# Patient Record
Sex: FEMALE | Race: BLACK OR AFRICAN AMERICAN | NOT HISPANIC OR LATINO | Employment: OTHER | URBAN - METROPOLITAN AREA
[De-identification: names, ages, dates, MRNs, and addresses within clinical notes are randomized per-mention and may not be internally consistent; named-entity substitution may affect disease eponyms.]

---

## 2023-03-06 ENCOUNTER — NURSING HOME VISIT (OUTPATIENT)
Dept: POST ACUTE CARE | Facility: EXTERNAL LOCATION | Age: 83
End: 2023-03-06
Payer: MEDICARE

## 2023-03-06 DIAGNOSIS — I10 ESSENTIAL (PRIMARY) HYPERTENSION: ICD-10-CM

## 2023-03-06 DIAGNOSIS — G30.9 ALZHEIMER'S DISEASE, UNSPECIFIED (MULTI): ICD-10-CM

## 2023-03-06 DIAGNOSIS — F32.9 MAJOR DEPRESSIVE DISORDER, REMISSION STATUS UNSPECIFIED, UNSPECIFIED WHETHER RECURRENT: ICD-10-CM

## 2023-03-06 DIAGNOSIS — F02.80 ALZHEIMER'S DISEASE, UNSPECIFIED (MULTI): ICD-10-CM

## 2023-03-06 PROBLEM — Z93.1 S/P PERCUTANEOUS ENDOSCOPIC GASTROSTOMY (PEG) TUBE PLACEMENT (MULTI): Status: RESOLVED | Noted: 2023-03-06 | Resolved: 2023-03-06

## 2023-03-06 PROBLEM — E46 MALNUTRITION (MULTI): Status: ACTIVE | Noted: 2023-03-06

## 2023-03-06 PROBLEM — F03.90 DEMENTIA (MULTI): Status: ACTIVE | Noted: 2023-03-06

## 2023-03-06 PROBLEM — R00.1 BRADYCARDIA: Status: RESOLVED | Noted: 2023-03-06 | Resolved: 2023-03-06

## 2023-03-06 PROCEDURE — 99309 SBSQ NF CARE MODERATE MDM 30: CPT | Performed by: INTERNAL MEDICINE

## 2023-03-06 NOTE — LETTER
Subjective  Chief complaint: Ella Simms is a 83 y.o. female who is a long term resident. Presents forGeneral medical care and follow-up  HPI:  Patient presents for general medical care and f/u.  Patient seen and examined at bedside.  Patient denies any pain or discomfort.  Patient continues on oxygen via nasal cannula.  No signs or symptoms of any respiratory distress.  Patient denies any shortness of breath or wheezing.  Staff reports no new issues.  No acute distress.        Review of Systems  All systems reviewed and negative except for what was mentioned in the HPI    Vital signs: 117/69, 97.6, 68, 18    Objective  Physical Exam  Constitutional:       General: She is not in acute distress.  Eyes:      Extraocular Movements: Extraocular movements intact.   Cardiovascular:      Rate and Rhythm: Regular rhythm.   Pulmonary:      Effort: Pulmonary effort is normal.      Breath sounds: Normal breath sounds.   Abdominal:      General: Bowel sounds are normal.      Palpations: Abdomen is soft.   Musculoskeletal:      Cervical back: Neck supple.      Right lower leg: No edema.      Left lower leg: No edema.   Neurological:      Mental Status: She is alert.   Psychiatric:         Mood and Affect: Mood normal.         Behavior: Behavior is cooperative.         Assessment/Plan  Problem List Items Addressed This Visit       Alzheimer's disease, unspecified (CMS/HCC)     Continue Aricept.         Essential (primary) hypertension     Blood pressures controlled.  Continue metoprolol.         Major depressive disorder      Mood is stable.  Denies feeling down and thoughts of harming self or others.Continue Remeron, paroxetine, Benzatropine          Medications, treatments, and labs reviewed  Continue medications and treatments as listed in [facility]  Scribe Attestation  By signing my name below, erin HOLLIS Scribe   attest that this documentation has been prepared under the direction and in the presence of Yanet Brand  MD.    Provider Attestation - Scribe documentation  All medical record entries made by the Scribe were at my direction and personally dictated by me. I have reviewed the chart and agree that the record accurately reflects my personal performance of the history, physical exam, discussion and plan.

## 2023-03-06 NOTE — ASSESSMENT & PLAN NOTE
Mood is stable.  Denies feeling down and thoughts of harming self or others.Continue Remeron, paroxetine, Benzatropine

## 2023-03-06 NOTE — PROGRESS NOTES
Subjective   Chief complaint: Ella Simms is a 83 y.o. female who is a long term resident. Presents forGeneral medical care and follow-up  HPI:  Patient presents for general medical care and f/u.  Patient seen and examined at bedside.  Patient denies any pain or discomfort.  Patient continues on oxygen via nasal cannula.  No signs or symptoms of any respiratory distress.  Patient denies any shortness of breath or wheezing.  Staff reports no new issues.  No acute distress.        Review of Systems  All systems reviewed and negative except for what was mentioned in the HPI    Vital signs: 117/69, 97.6, 68, 18    Objective   Physical Exam  Constitutional:       General: She is not in acute distress.  Eyes:      Extraocular Movements: Extraocular movements intact.   Cardiovascular:      Rate and Rhythm: Regular rhythm.   Pulmonary:      Effort: Pulmonary effort is normal.      Breath sounds: Normal breath sounds.   Abdominal:      General: Bowel sounds are normal.      Palpations: Abdomen is soft.   Musculoskeletal:      Cervical back: Neck supple.      Right lower leg: No edema.      Left lower leg: No edema.   Neurological:      Mental Status: She is alert.   Psychiatric:         Mood and Affect: Mood normal.         Behavior: Behavior is cooperative.         Assessment/Plan   Problem List Items Addressed This Visit       Alzheimer's disease, unspecified (CMS/HCC)     Continue Aricept.         Essential (primary) hypertension     Blood pressures controlled.  Continue metoprolol.         Major depressive disorder      Mood is stable.  Denies feeling down and thoughts of harming self or others.Continue Remeron, paroxetine, Benzatropine          Medications, treatments, and labs reviewed  Continue medications and treatments as listed in [facility]  Scribe Attestation  By signing my name below, erin HOLLIS Scribe   attest that this documentation has been prepared under the direction and in the presence of Yanet CORLEY  MD Sunday.    Provider Attestation - Scribe documentation  All medical record entries made by the Scribe were at my direction and personally dictated by me. I have reviewed the chart and agree that the record accurately reflects my personal performance of the history, physical exam, discussion and plan.

## 2023-03-17 NOTE — PROGRESS NOTES
Subjective   Chief complaint: Ella Simms is a 83 y.o. female who is a long term resident. Presents forGeneral medical care and follow-up  HPI:  Patient presents for general medical care and f/u.  Patient seen and examined at bedside.  Patient denies any pain or discomfort.  Patient continues on oxygen via nasal cannula.  No signs or symptoms of any respiratory distress.  Patient denies any shortness of breath or wheezing.  Staff reports no new issues.  No acute distress.        Review of Systems  All systems reviewed and negative except for what was mentioned in the HPI    Vital signs: 117/69, 97.6, 68, 18    Objective   Physical Exam  Constitutional:       General: She is not in acute distress.  Eyes:      Extraocular Movements: Extraocular movements intact.   Cardiovascular:      Rate and Rhythm: Regular rhythm.   Pulmonary:      Effort: Pulmonary effort is normal.      Breath sounds: Normal breath sounds.   Abdominal:      General: Bowel sounds are normal.      Palpations: Abdomen is soft.   Musculoskeletal:      Cervical back: Neck supple.      Right lower leg: No edema.      Left lower leg: No edema.   Neurological:      Mental Status: She is alert.   Psychiatric:         Mood and Affect: Mood normal.         Behavior: Behavior is cooperative.         Assessment/Plan   Problem List Items Addressed This Visit          Nervous    Alzheimer's disease, unspecified (CMS/HCC)     Continue Aricept.            Circulatory    Essential (primary) hypertension     Blood pressures controlled.  Continue metoprolol.            Other    Major depressive disorder      Mood is stable.  Denies feeling down and thoughts of harming self or others.Continue Remeron, paroxetine, Benzatropine          Medications, treatments, and labs reviewed  Continue medications and treatments as listed in [facility]  Scribe Attestation  By signing my name below, erin HOLLIS , Cathy   attest that this documentation has been prepared under the  direction and in the presence of Yanet Brand MD.    Provider Attestation - Scribe documentation  All medical record entries made by the Scribe were at my direction and personally dictated by me. I have reviewed the chart and agree that the record accurately reflects my personal performance of the history, physical exam, discussion and plan.

## 2023-05-01 ENCOUNTER — NURSING HOME VISIT (OUTPATIENT)
Dept: POST ACUTE CARE | Facility: EXTERNAL LOCATION | Age: 83
End: 2023-05-01
Payer: MEDICARE

## 2023-05-01 DIAGNOSIS — F32.9 MAJOR DEPRESSIVE DISORDER, REMISSION STATUS UNSPECIFIED, UNSPECIFIED WHETHER RECURRENT: ICD-10-CM

## 2023-05-01 DIAGNOSIS — F02.80 ALZHEIMER'S DISEASE, UNSPECIFIED (MULTI): ICD-10-CM

## 2023-05-01 DIAGNOSIS — G30.9 ALZHEIMER'S DISEASE, UNSPECIFIED (MULTI): ICD-10-CM

## 2023-05-01 DIAGNOSIS — I10 ESSENTIAL (PRIMARY) HYPERTENSION: ICD-10-CM

## 2023-05-01 PROCEDURE — 99309 SBSQ NF CARE MODERATE MDM 30: CPT | Performed by: INTERNAL MEDICINE

## 2023-05-01 NOTE — LETTER
Patient: Ella Simms  : 1940    Encounter Date: 2023    PROGRESS NOTE    Subjective  Chief complaint: Ella Simms is a 83 y.o. female who is a long term care patient being seen and evaluated for monthly general medical care and follow-up.    HPI:    patient presents for general medical care and f/u.  Patient seen and examined at bedside.  No issues per nursing.  Patient has no acute complaints.  Patient has ALZ/dementia and requires assist with ADLs.    Patient with diagnosis of depression.    Denies feeling down and thoughts of harming self or others.  HTN,  Denies chest pain and headache.            Objective  Vital signs:   125/73, 98%    Physical Exam  Constitutional:       General: She is not in acute distress.  Eyes:      Extraocular Movements: Extraocular movements intact.   Cardiovascular:      Rate and Rhythm: Normal rate and regular rhythm.   Pulmonary:      Effort: Pulmonary effort is normal.      Breath sounds: Normal breath sounds.   Abdominal:      General: Bowel sounds are normal.      Palpations: Abdomen is soft.   Musculoskeletal:      Cervical back: Neck supple.      Right lower leg: No edema.      Left lower leg: No edema.   Neurological:      Mental Status: She is alert.   Psychiatric:         Mood and Affect: Mood normal.         Behavior: Behavior is cooperative.         Assessment/Plan  Problem List Items Addressed This Visit          Nervous    Alzheimer's disease, unspecified (CMS/HCC)     Continue Aricept.  Mentation at baseline            Circulatory    Essential (primary) hypertension     Blood pressures controlled.  Continue metoprolol.            Other    Major depressive disorder      Mood is stable.  Denies feeling down and thoughts of harming self or others.Continue Remeron, paroxetine, Benzatropine          Medications, treatments, and labs reviewed  Continue medications and treatments as listed in [facility]    Scribe Attestation  By signing my name below, Ani HOLLIS  Cathy Mcclain   attest that this documentation has been prepared under the direction and in the presence of Yanet Brand MD.    Provider Attestation - Scribe documentation  All medical record entries made by the Scribe were at my direction and personally dictated by me. I have reviewed the chart and agree that the record accurately reflects my personal performance of the history, physical exam, discussion and plan.    1. Alzheimer's disease, unspecified (CMS/HCC)        2. Essential (primary) hypertension        3. Major depressive disorder, remission status unspecified, unspecified whether recurrent               Electronically Signed By: Yanet Brand MD   5/2/23  5:31 PM

## 2023-05-02 NOTE — PROGRESS NOTES
PROGRESS NOTE    Subjective   Chief complaint: Ella Simms is a 83 y.o. female who is a long term care patient being seen and evaluated for monthly general medical care and follow-up.    HPI:    patient presents for general medical care and f/u.  Patient seen and examined at bedside.  No issues per nursing.  Patient has no acute complaints.  Patient has ALZ/dementia and requires assist with ADLs.    Patient with diagnosis of depression.    Denies feeling down and thoughts of harming self or others.  HTN,  Denies chest pain and headache.            Objective   Vital signs:   125/73, 98%    Physical Exam  Constitutional:       General: She is not in acute distress.  Eyes:      Extraocular Movements: Extraocular movements intact.   Cardiovascular:      Rate and Rhythm: Normal rate and regular rhythm.   Pulmonary:      Effort: Pulmonary effort is normal.      Breath sounds: Normal breath sounds.   Abdominal:      General: Bowel sounds are normal.      Palpations: Abdomen is soft.   Musculoskeletal:      Cervical back: Neck supple.      Right lower leg: No edema.      Left lower leg: No edema.   Neurological:      Mental Status: She is alert.   Psychiatric:         Mood and Affect: Mood normal.         Behavior: Behavior is cooperative.         Assessment/Plan   Problem List Items Addressed This Visit          Nervous    Alzheimer's disease, unspecified (CMS/HCC)     Continue Aricept.  Mentation at baseline            Circulatory    Essential (primary) hypertension     Blood pressures controlled.  Continue metoprolol.            Other    Major depressive disorder      Mood is stable.  Denies feeling down and thoughts of harming self or others.Continue Remeron, paroxetine, Benzatropine          Medications, treatments, and labs reviewed  Continue medications and treatments as listed in [facility]    Scribe Attestation  By signing my name below, IAni, Scribe   attest that this documentation has been prepared  under the direction and in the presence of Yanet Brand MD.    Provider Attestation - Scribe documentation  All medical record entries made by the Scribe were at my direction and personally dictated by me. I have reviewed the chart and agree that the record accurately reflects my personal performance of the history, physical exam, discussion and plan.    1. Alzheimer's disease, unspecified (CMS/HCC)        2. Essential (primary) hypertension        3. Major depressive disorder, remission status unspecified, unspecified whether recurrent

## 2023-06-05 ENCOUNTER — NURSING HOME VISIT (OUTPATIENT)
Dept: POST ACUTE CARE | Facility: EXTERNAL LOCATION | Age: 83
End: 2023-06-05
Payer: MEDICARE

## 2023-06-05 DIAGNOSIS — F02.80 ALZHEIMER'S DISEASE, UNSPECIFIED (MULTI): ICD-10-CM

## 2023-06-05 DIAGNOSIS — F32.9 MAJOR DEPRESSIVE DISORDER, REMISSION STATUS UNSPECIFIED, UNSPECIFIED WHETHER RECURRENT: ICD-10-CM

## 2023-06-05 DIAGNOSIS — G30.9 ALZHEIMER'S DISEASE, UNSPECIFIED (MULTI): ICD-10-CM

## 2023-06-05 DIAGNOSIS — I10 ESSENTIAL (PRIMARY) HYPERTENSION: ICD-10-CM

## 2023-06-05 PROCEDURE — 99309 SBSQ NF CARE MODERATE MDM 30: CPT | Performed by: INTERNAL MEDICINE

## 2023-06-05 NOTE — LETTER
Patient: Ella Simms  : 1940    Encounter Date: 2023    PROGRESS NOTE    Subjective  Chief complaint: Ella Simms is a 83 y.o. female who is a long term care patient being seen and evaluated for monthly general medical care and follow-up.    HPI:    patient presents for general medical care and f/u.  Patient seen and examined at bedside.  No issues per nursing.  Patient has no acute complaints.  Patient has ALZ/dementia and requires assist with ADLs.    Patient with diagnosis of depression.    Denies feeling down and thoughts of harming self or others.  HTN,  Denies chest pain and headache.            Objective  Vital signs:   124/74, 98%    Physical Exam  Constitutional:       General: She is not in acute distress.  Eyes:      Extraocular Movements: Extraocular movements intact.   Cardiovascular:      Rate and Rhythm: Normal rate and regular rhythm.   Pulmonary:      Effort: Pulmonary effort is normal.      Breath sounds: Normal breath sounds.   Abdominal:      General: Bowel sounds are normal.      Palpations: Abdomen is soft.   Musculoskeletal:      Cervical back: Neck supple.      Right lower leg: No edema.      Left lower leg: No edema.   Neurological:      Mental Status: She is alert.   Psychiatric:         Mood and Affect: Mood normal.         Behavior: Behavior is cooperative.         Assessment/Plan  Problem List Items Addressed This Visit          Nervous    Alzheimer's disease, unspecified (CMS/HCC)     Continue Aricept.  Mentation at baseline            Circulatory    Essential (primary) hypertension     Blood pressures controlled.    Continue metoprolol.  Monitor BP            Other    Major depressive disorder      Mood is stable.    .Continue Remeron, paroxetine, Benzatropine        Medications, treatments, and labs reviewed  Continue medications and treatments as listed in [facility]    Scribe Attestation  By signing my name below, I, Ani Mcclain, Scribe   attest that this  documentation has been prepared under the direction and in the presence of Yanet Brand MD.    Provider Attestation - Scribe documentation  All medical record entries made by the Scribe were at my direction and personally dictated by me. I have reviewed the chart and agree that the record accurately reflects my personal performance of the history, physical exam, discussion and plan.    1. Major depressive disorder, remission status unspecified, unspecified whether recurrent        2. Essential (primary) hypertension        3. Alzheimer's disease, unspecified (CMS/[facility])               Electronically Signed By: Yanet Brand MD   6/6/23  8:05 PM

## 2023-06-06 NOTE — PROGRESS NOTES
PROGRESS NOTE    Subjective   Chief complaint: Ella Simms is a 83 y.o. female who is a long term care patient being seen and evaluated for monthly general medical care and follow-up.    HPI:    patient presents for general medical care and f/u.  Patient seen and examined at bedside.  No issues per nursing.  Patient has no acute complaints.  Patient has ALZ/dementia and requires assist with ADLs.    Patient with diagnosis of depression.    Denies feeling down and thoughts of harming self or others.  HTN,  Denies chest pain and headache.            Objective   Vital signs:   124/74, 98%    Physical Exam  Constitutional:       General: She is not in acute distress.  Eyes:      Extraocular Movements: Extraocular movements intact.   Cardiovascular:      Rate and Rhythm: Normal rate and regular rhythm.   Pulmonary:      Effort: Pulmonary effort is normal.      Breath sounds: Normal breath sounds.   Abdominal:      General: Bowel sounds are normal.      Palpations: Abdomen is soft.   Musculoskeletal:      Cervical back: Neck supple.      Right lower leg: No edema.      Left lower leg: No edema.   Neurological:      Mental Status: She is alert.   Psychiatric:         Mood and Affect: Mood normal.         Behavior: Behavior is cooperative.         Assessment/Plan   Problem List Items Addressed This Visit          Nervous    Alzheimer's disease, unspecified (CMS/HCC)     Continue Aricept.  Mentation at baseline            Circulatory    Essential (primary) hypertension     Blood pressures controlled.    Continue metoprolol.  Monitor BP            Other    Major depressive disorder      Mood is stable.    .Continue Remeron, paroxetine, Benzatropine        Medications, treatments, and labs reviewed  Continue medications and treatments as listed in [facility]    Scribe Attestation  By signing my name below, IAni, Cathy   attest that this documentation has been prepared under the direction and in the presence of  Yanet Brand MD.    Provider Attestation - Scribe documentation  All medical record entries made by the Scribe were at my direction and personally dictated by me. I have reviewed the chart and agree that the record accurately reflects my personal performance of the history, physical exam, discussion and plan.    1. Major depressive disorder, remission status unspecified, unspecified whether recurrent        2. Essential (primary) hypertension        3. Alzheimer's disease, unspecified (CMS/[facility])

## 2023-07-03 ENCOUNTER — NURSING HOME VISIT (OUTPATIENT)
Dept: POST ACUTE CARE | Facility: EXTERNAL LOCATION | Age: 83
End: 2023-07-03
Payer: MEDICARE

## 2023-07-03 DIAGNOSIS — G30.9 ALZHEIMER'S DISEASE, UNSPECIFIED (MULTI): ICD-10-CM

## 2023-07-03 DIAGNOSIS — F02.80 ALZHEIMER'S DISEASE, UNSPECIFIED (MULTI): ICD-10-CM

## 2023-07-03 DIAGNOSIS — I10 ESSENTIAL (PRIMARY) HYPERTENSION: ICD-10-CM

## 2023-07-03 DIAGNOSIS — F32.9 MAJOR DEPRESSIVE DISORDER, REMISSION STATUS UNSPECIFIED, UNSPECIFIED WHETHER RECURRENT: ICD-10-CM

## 2023-07-03 PROCEDURE — 99309 SBSQ NF CARE MODERATE MDM 30: CPT | Performed by: INTERNAL MEDICINE

## 2023-07-03 NOTE — PROGRESS NOTES
PROGRESS NOTE    Subjective   Chief complaint: Ella Simms is a 83 y.o. female who is a long term care patient being seen and evaluated for monthly general medical care and follow-up    HPI:  Patient presents for general medical care and f/u.  Patient seen and examined at bedside.  No issues per nursing.  Patient has no acute complaints.  Patient has ALZ/dementia and requires assist with ADLs.    Patient with diagnosis of depression, denies feeling down and thoughts of harming self or others.  HTN, denies chest pain and headache.  No new concerns today.  Denies n/v/f/c pain.            Objective   Vital signs: 130/80,69,94%,98.0,18    Physical Exam  Constitutional:       General: She is not in acute distress.  Eyes:      Extraocular Movements: Extraocular movements intact.   Cardiovascular:      Rate and Rhythm: Normal rate and regular rhythm.   Pulmonary:      Effort: Pulmonary effort is normal.      Breath sounds: Normal breath sounds.   Abdominal:      General: Bowel sounds are normal.      Palpations: Abdomen is soft.   Musculoskeletal:      Cervical back: Neck supple.      Right lower leg: No edema.      Left lower leg: No edema.   Neurological:      Mental Status: She is alert.   Psychiatric:         Mood and Affect: Mood normal.         Behavior: Behavior is cooperative.         Assessment/Plan   Problem List Items Addressed This Visit       Essential (primary) hypertension     Blood pressures controlled.    Continue metoprolol.  Monitor BP         Alzheimer's disease, unspecified (CMS/HCC)     Continue Aricept.  Mentation at baseline         Major depressive disorder      Mood is stable.    Continue Remeron, paroxetine, Benzatropine          Medications, treatments, and labs reviewed  Continue medications and treatments as listed in EMR      Scribe Attestation  I, Cathy Garcia   attest that this documentation has been prepared under the direction and in the presence of Yanet Brand MD.      Provider Attestation - Scribe documentation  All medical record entries made by the Scribe were at my direction and personally dictated by me. I have reviewed the chart and agree that the record accurately reflects my personal performance of the history, physical exam, discussion and plan.   Yanet Brand MD

## 2023-07-03 NOTE — LETTER
Patient: Ella Simms  : 1940    Encounter Date: 2023    PROGRESS NOTE    Subjective  Chief complaint: Ella Simms is a 83 y.o. female who is a long term care patient being seen and evaluated for monthly general medical care and follow-up    HPI:  Patient presents for general medical care and f/u.  Patient seen and examined at bedside.  No issues per nursing.  Patient has no acute complaints.  Patient has ALZ/dementia and requires assist with ADLs.    Patient with diagnosis of depression, denies feeling down and thoughts of harming self or others.  HTN, denies chest pain and headache.  No new concerns today.  Denies n/v/f/c pain.            Objective  Vital signs: 130/80,69,94%,98.0,18    Physical Exam  Constitutional:       General: She is not in acute distress.  Eyes:      Extraocular Movements: Extraocular movements intact.   Cardiovascular:      Rate and Rhythm: Normal rate and regular rhythm.   Pulmonary:      Effort: Pulmonary effort is normal.      Breath sounds: Normal breath sounds.   Abdominal:      General: Bowel sounds are normal.      Palpations: Abdomen is soft.   Musculoskeletal:      Cervical back: Neck supple.      Right lower leg: No edema.      Left lower leg: No edema.   Neurological:      Mental Status: She is alert.   Psychiatric:         Mood and Affect: Mood normal.         Behavior: Behavior is cooperative.         Assessment/Plan  Problem List Items Addressed This Visit       Essential (primary) hypertension     Blood pressures controlled.    Continue metoprolol.  Monitor BP         Alzheimer's disease, unspecified (CMS/HCC)     Continue Aricept.  Mentation at baseline         Major depressive disorder      Mood is stable.    Continue Remeron, paroxetine, Benzatropine          Medications, treatments, and labs reviewed  Continue medications and treatments as listed in EMR      Scribe Attestation  I, Valerie Saini, Cathy   attest that this documentation has been  prepared under the direction and in the presence of Yanet Brand MD.     Provider Attestation - Scribe documentation  All medical record entries made by the Scribe were at my direction and personally dictated by me. I have reviewed the chart and agree that the record accurately reflects my personal performance of the history, physical exam, discussion and plan.   Yanet Brand MD      Electronically Signed By: Yanet Brand MD   7/3/23  4:18 PM

## 2023-08-14 ENCOUNTER — NURSING HOME VISIT (OUTPATIENT)
Dept: POST ACUTE CARE | Facility: EXTERNAL LOCATION | Age: 83
End: 2023-08-14
Payer: MEDICARE

## 2023-08-14 DIAGNOSIS — F02.80 ALZHEIMER'S DISEASE, UNSPECIFIED (MULTI): ICD-10-CM

## 2023-08-14 DIAGNOSIS — F32.9 MAJOR DEPRESSIVE DISORDER, REMISSION STATUS UNSPECIFIED, UNSPECIFIED WHETHER RECURRENT: Primary | ICD-10-CM

## 2023-08-14 DIAGNOSIS — I10 ESSENTIAL (PRIMARY) HYPERTENSION: ICD-10-CM

## 2023-08-14 DIAGNOSIS — G30.9 ALZHEIMER'S DISEASE, UNSPECIFIED (MULTI): ICD-10-CM

## 2023-08-14 PROCEDURE — 99309 SBSQ NF CARE MODERATE MDM 30: CPT | Performed by: INTERNAL MEDICINE

## 2023-08-14 NOTE — LETTER
Patient: Ella Simms  : 1940    Encounter Date: 2023    PROGRESS NOTE    Subjective  Chief complaint: Ella Simms is a 83 y.o. female who is a long term care patient being seen and evaluated for monthly general medical care and follow-up    HPI:  Patient presents for general medical care and f/u.  Patient seen and examined at bedside.  No issues per nursing.  Patient has no acute complaints.  Patient has ALZ/dementia and requires assist with ADLs.    Patient with diagnosis of depression, denies feeling down and thoughts of harming self or others.  HTN, denies chest pain and headache.  No new concerns today.  Denies n/v/f/c pain.            Objective  Vital signs: 128/79, 97%    Physical Exam  Constitutional:       General: She is not in acute distress.  Eyes:      Extraocular Movements: Extraocular movements intact.   Cardiovascular:      Rate and Rhythm: Normal rate and regular rhythm.   Pulmonary:      Effort: Pulmonary effort is normal.      Breath sounds: Normal breath sounds.   Abdominal:      General: Bowel sounds are normal.      Palpations: Abdomen is soft.   Musculoskeletal:      Cervical back: Neck supple.      Right lower leg: No edema.      Left lower leg: No edema.   Neurological:      Mental Status: She is alert.   Psychiatric:         Mood and Affect: Mood normal.         Behavior: Behavior is cooperative.         Assessment/Plan  Problem List Items Addressed This Visit       Essential (primary) hypertension     Blood pressures controlled.    Continue metoprolol.  Monitor BP         Alzheimer's disease, unspecified (CMS/HCC)     Continue Aricept.  Mentation at baseline         Major depressive disorder - Primary      Mood is stable.    Continue Remeron, paroxetine, Benzatropine        Medications, treatments, and labs reviewed  Continue medications and treatments as listed in EMR      Scribe Attestation  I, Ani Mcclain, Scribe   attest that this documentation has been prepared  under the direction and in the presence of Yanet Brand MD.     Provider Attestation - Scribe documentation  All medical record entries made by the Scribe were at my direction and personally dictated by me. I have reviewed the chart and agree that the record accurately reflects my personal performance of the history, physical exam, discussion and plan.   Yanet Brand MD  1. Major depressive disorder, remission status unspecified, unspecified whether recurrent        2. Essential (primary) hypertension        3. Alzheimer's disease, unspecified (CMS/[facility])              Electronically Signed By: Yanet Brand MD   8/14/23  3:50 PM

## 2023-08-14 NOTE — PROGRESS NOTES
PROGRESS NOTE    Subjective   Chief complaint: Ella Simms is a 83 y.o. female who is a long term care patient being seen and evaluated for monthly general medical care and follow-up    HPI:  Patient presents for general medical care and f/u.  Patient seen and examined at bedside.  No issues per nursing.  Patient has no acute complaints.  Patient has ALZ/dementia and requires assist with ADLs.    Patient with diagnosis of depression, denies feeling down and thoughts of harming self or others.  HTN, denies chest pain and headache.  No new concerns today.  Denies n/v/f/c pain.            Objective   Vital signs: 128/79, 97%    Physical Exam  Constitutional:       General: She is not in acute distress.  Eyes:      Extraocular Movements: Extraocular movements intact.   Cardiovascular:      Rate and Rhythm: Normal rate and regular rhythm.   Pulmonary:      Effort: Pulmonary effort is normal.      Breath sounds: Normal breath sounds.   Abdominal:      General: Bowel sounds are normal.      Palpations: Abdomen is soft.   Musculoskeletal:      Cervical back: Neck supple.      Right lower leg: No edema.      Left lower leg: No edema.   Neurological:      Mental Status: She is alert.   Psychiatric:         Mood and Affect: Mood normal.         Behavior: Behavior is cooperative.         Assessment/Plan   Problem List Items Addressed This Visit       Essential (primary) hypertension     Blood pressures controlled.    Continue metoprolol.  Monitor BP         Alzheimer's disease, unspecified (CMS/HCC)     Continue Aricept.  Mentation at baseline         Major depressive disorder - Primary      Mood is stable.    Continue Remeron, paroxetine, Benzatropine        Medications, treatments, and labs reviewed  Continue medications and treatments as listed in EMR      Scribe Attestation  I, Ani Mcclain Scribe   attest that this documentation has been prepared under the direction and in the presence of Yanet Brand MD.      Provider Attestation - Scribe documentation  All medical record entries made by the Scribe were at my direction and personally dictated by me. I have reviewed the chart and agree that the record accurately reflects my personal performance of the history, physical exam, discussion and plan.   Yanet Brand MD  1. Major depressive disorder, remission status unspecified, unspecified whether recurrent        2. Essential (primary) hypertension        3. Alzheimer's disease, unspecified (CMS/[facility])

## 2023-09-11 ENCOUNTER — NURSING HOME VISIT (OUTPATIENT)
Dept: POST ACUTE CARE | Facility: EXTERNAL LOCATION | Age: 83
End: 2023-09-11
Payer: MEDICARE

## 2023-09-11 DIAGNOSIS — F02.80 ALZHEIMER'S DISEASE, UNSPECIFIED (MULTI): ICD-10-CM

## 2023-09-11 DIAGNOSIS — F32.9 MAJOR DEPRESSIVE DISORDER, REMISSION STATUS UNSPECIFIED, UNSPECIFIED WHETHER RECURRENT: Primary | ICD-10-CM

## 2023-09-11 DIAGNOSIS — G30.9 ALZHEIMER'S DISEASE, UNSPECIFIED (MULTI): ICD-10-CM

## 2023-09-11 DIAGNOSIS — I10 ESSENTIAL (PRIMARY) HYPERTENSION: ICD-10-CM

## 2023-09-11 PROCEDURE — 99309 SBSQ NF CARE MODERATE MDM 30: CPT | Performed by: INTERNAL MEDICINE

## 2023-09-11 NOTE — LETTER
Patient: Ella Simms  : 1940    Encounter Date: 2023    PROGRESS NOTE    Subjective  Chief complaint: Ella Simms is a 83 y.o. female who is a long term care patient being seen and evaluated for monthly general medical care and follow-up    HPI:  Patient presents for general medical care and f/u.  Patient seen and examined at bedside.  No issues per nursing.  Patient has no acute complaints.  Patient has ALZ/dementia and requires assist with ADLs.    Patient with diagnosis of depression, denies feeling down and thoughts of harming self or others.  HTN, denies chest pain and headache.  No new concerns today.  Denies n/v/f/c pain.            Objective  Vital signs: 129/76, 95%    Physical Exam  Constitutional:       General: She is not in acute distress.  Eyes:      Extraocular Movements: Extraocular movements intact.   Cardiovascular:      Rate and Rhythm: Normal rate and regular rhythm.   Pulmonary:      Effort: Pulmonary effort is normal.      Breath sounds: Normal breath sounds.   Abdominal:      General: Bowel sounds are normal.      Palpations: Abdomen is soft.   Musculoskeletal:      Cervical back: Neck supple.      Right lower leg: No edema.      Left lower leg: No edema.   Neurological:      Mental Status: She is alert.   Psychiatric:         Mood and Affect: Mood normal.         Behavior: Behavior is cooperative.         Assessment/Plan  Problem List Items Addressed This Visit       Essential (primary) hypertension     Blood pressures controlled.    Continue metoprolol.  Monitor BP         Alzheimer's disease, unspecified (CMS/HCC)     Continue Aricept.  Mentation at baseline         Major depressive disorder - Primary      Mood is stable.    Continue Remeron, paroxetine, Benzatropine        Medications, treatments, and labs reviewed  Continue medications and treatments as listed in EMR      Scribe Attestation  I, Ani sky, Scribe   attest that this documentation has been prepared  under the direction and in the presence of Yanet Brand MD.     Provider Attestation - Scribe documentation  All medical record entries made by the Scribe were at my direction and personally dictated by me. I have reviewed the chart and agree that the record accurately reflects my personal performance of the history, physical exam, discussion and plan.   Yanet Brand MD  1. Major depressive disorder, remission status unspecified, unspecified whether recurrent        2. Alzheimer's disease, unspecified (CMS/[facility])        3. Essential (primary) hypertension              Electronically Signed By: Yanet Brand MD   9/13/23  1:37 PM

## 2023-09-13 NOTE — PROGRESS NOTES
PROGRESS NOTE    Subjective   Chief complaint: Ella Simms is a 83 y.o. female who is a long term care patient being seen and evaluated for monthly general medical care and follow-up    HPI:  Patient presents for general medical care and f/u.  Patient seen and examined at bedside.  No issues per nursing.  Patient has no acute complaints.  Patient has ALZ/dementia and requires assist with ADLs.    Patient with diagnosis of depression, denies feeling down and thoughts of harming self or others.  HTN, denies chest pain and headache.  No new concerns today.  Denies n/v/f/c pain.            Objective   Vital signs: 129/76, 95%    Physical Exam  Constitutional:       General: She is not in acute distress.  Eyes:      Extraocular Movements: Extraocular movements intact.   Cardiovascular:      Rate and Rhythm: Normal rate and regular rhythm.   Pulmonary:      Effort: Pulmonary effort is normal.      Breath sounds: Normal breath sounds.   Abdominal:      General: Bowel sounds are normal.      Palpations: Abdomen is soft.   Musculoskeletal:      Cervical back: Neck supple.      Right lower leg: No edema.      Left lower leg: No edema.   Neurological:      Mental Status: She is alert.   Psychiatric:         Mood and Affect: Mood normal.         Behavior: Behavior is cooperative.         Assessment/Plan   Problem List Items Addressed This Visit       Essential (primary) hypertension     Blood pressures controlled.    Continue metoprolol.  Monitor BP         Alzheimer's disease, unspecified (CMS/HCC)     Continue Aricept.  Mentation at baseline         Major depressive disorder - Primary      Mood is stable.    Continue Remeron, paroxetine, Benzatropine        Medications, treatments, and labs reviewed  Continue medications and treatments as listed in EMR      Scribe Attestation  I, Ani sky Scribe   attest that this documentation has been prepared under the direction and in the presence of Yanet Brand MD.      Provider Attestation - Scribe documentation  All medical record entries made by the Scribe were at my direction and personally dictated by me. I have reviewed the chart and agree that the record accurately reflects my personal performance of the history, physical exam, discussion and plan.   Yanet Brand MD  1. Major depressive disorder, remission status unspecified, unspecified whether recurrent        2. Alzheimer's disease, unspecified (CMS/[facility])        3. Essential (primary) hypertension

## 2023-11-06 ENCOUNTER — NURSING HOME VISIT (OUTPATIENT)
Dept: POST ACUTE CARE | Facility: EXTERNAL LOCATION | Age: 83
End: 2023-11-06
Payer: MEDICARE

## 2023-11-06 DIAGNOSIS — F02.80 ALZHEIMER'S DISEASE, UNSPECIFIED (MULTI): ICD-10-CM

## 2023-11-06 DIAGNOSIS — G30.9 ALZHEIMER'S DISEASE, UNSPECIFIED (MULTI): ICD-10-CM

## 2023-11-06 DIAGNOSIS — F32.9 MAJOR DEPRESSIVE DISORDER, REMISSION STATUS UNSPECIFIED, UNSPECIFIED WHETHER RECURRENT: ICD-10-CM

## 2023-11-06 DIAGNOSIS — I10 ESSENTIAL (PRIMARY) HYPERTENSION: ICD-10-CM

## 2023-11-06 PROCEDURE — 99309 SBSQ NF CARE MODERATE MDM 30: CPT | Performed by: INTERNAL MEDICINE

## 2023-11-06 NOTE — LETTER
Patient: Ella Simms  : 1940    Encounter Date: 2023    PROGRESS NOTE    Subjective  Chief complaint: Ella iSmms is a 83 y.o. female who is a long term care patient being seen and evaluated for monthly general medical care and follow-up.    HPI:  Patient presents for general medical care and f/u.  Patient seen and examined at bedside.  No issues per nursing.  Patient has no acute complaints.  Patient has ALZ/dementia and requires assist with ADLs.  Patient with diagnosis of depression, denies feeling down and thoughts of harming self or others.  HTN,  Denies chest pain and headache.            Objective  Vital signs: 105/70,60,94%    Physical Exam  Constitutional:       General: She is not in acute distress.  Eyes:      Extraocular Movements: Extraocular movements intact.   Cardiovascular:      Rate and Rhythm: Normal rate and regular rhythm.   Pulmonary:      Effort: Pulmonary effort is normal.      Breath sounds: Normal breath sounds.   Abdominal:      General: Bowel sounds are normal.      Palpations: Abdomen is soft.   Musculoskeletal:      Cervical back: Neck supple.      Right lower leg: No edema.      Left lower leg: No edema.   Neurological:      Mental Status: She is alert.   Psychiatric:         Mood and Affect: Mood normal.         Behavior: Behavior is cooperative.         Assessment/Plan  Problem List Items Addressed This Visit       Essential (primary) hypertension     Blood pressures controlled.    Continue metoprolol.  Monitor BP         Alzheimer's disease, unspecified (CMS/HCC)     Continue Aricept.  Mentation at baseline         Major depressive disorder      Mood is stable.    Continue Remeron, paroxetine, Benzatropine          Medications, treatments, and labs reviewed  Continue medications and treatments as listed in EMR      Scribe Attestation  I, Cathy Garcia   attest that this documentation has been prepared under the direction and in the presence of Yanet  BARNEY Brand MD.     Provider Attestation - Scribe documentation  All medical record entries made by the Scribe were at my direction and personally dictated by me. I have reviewed the chart and agree that the record accurately reflects my personal performance of the history, physical exam, discussion and plan.   Yanet Brand MD      Electronically Signed By: Yanet Brand MD   11/6/23  5:05 PM

## 2023-11-06 NOTE — PROGRESS NOTES
PROGRESS NOTE    Subjective   Chief complaint: Ella Simms is a 83 y.o. female who is a long term care patient being seen and evaluated for monthly general medical care and follow-up.    HPI:  Patient presents for general medical care and f/u.  Patient seen and examined at bedside.  No issues per nursing.  Patient has no acute complaints.  Patient has ALZ/dementia and requires assist with ADLs.  Patient with diagnosis of depression, denies feeling down and thoughts of harming self or others.  HTN,  Denies chest pain and headache.            Objective   Vital signs: 105/70,60,94%    Physical Exam  Constitutional:       General: She is not in acute distress.  Eyes:      Extraocular Movements: Extraocular movements intact.   Cardiovascular:      Rate and Rhythm: Normal rate and regular rhythm.   Pulmonary:      Effort: Pulmonary effort is normal.      Breath sounds: Normal breath sounds.   Abdominal:      General: Bowel sounds are normal.      Palpations: Abdomen is soft.   Musculoskeletal:      Cervical back: Neck supple.      Right lower leg: No edema.      Left lower leg: No edema.   Neurological:      Mental Status: She is alert.   Psychiatric:         Mood and Affect: Mood normal.         Behavior: Behavior is cooperative.         Assessment/Plan   Problem List Items Addressed This Visit       Essential (primary) hypertension     Blood pressures controlled.    Continue metoprolol.  Monitor BP         Alzheimer's disease, unspecified (CMS/HCC)     Continue Aricept.  Mentation at baseline         Major depressive disorder      Mood is stable.    Continue Remeron, paroxetine, Benzatropine          Medications, treatments, and labs reviewed  Continue medications and treatments as listed in EMR      Scribe Attestation  I, Cathy Garcia   attest that this documentation has been prepared under the direction and in the presence of Yanet Brand MD.     Provider Attestation - Scribe documentation  All  medical record entries made by the Scribe were at my direction and personally dictated by me. I have reviewed the chart and agree that the record accurately reflects my personal performance of the history, physical exam, discussion and plan.   Yanet Brand MD

## 2023-12-04 ENCOUNTER — NURSING HOME VISIT (OUTPATIENT)
Dept: POST ACUTE CARE | Facility: EXTERNAL LOCATION | Age: 83
End: 2023-12-04
Payer: MEDICARE

## 2023-12-04 DIAGNOSIS — I10 ESSENTIAL (PRIMARY) HYPERTENSION: ICD-10-CM

## 2023-12-04 DIAGNOSIS — F32.9 MAJOR DEPRESSIVE DISORDER, REMISSION STATUS UNSPECIFIED, UNSPECIFIED WHETHER RECURRENT: ICD-10-CM

## 2023-12-04 DIAGNOSIS — F02.80 ALZHEIMER'S DISEASE, UNSPECIFIED (MULTI): ICD-10-CM

## 2023-12-04 DIAGNOSIS — G30.9 ALZHEIMER'S DISEASE, UNSPECIFIED (MULTI): ICD-10-CM

## 2023-12-04 PROCEDURE — 99309 SBSQ NF CARE MODERATE MDM 30: CPT | Performed by: INTERNAL MEDICINE

## 2023-12-04 NOTE — LETTER
Patient: Ella Simms  : 1940    Encounter Date: 2023    PROGRESS NOTE    Subjective  Chief complaint: Ella Simms is a 83 y.o. female who is a long term care patient being seen and evaluated for monthly general medical care and follow-up    HPI:  Hospice patient presents for general medical care and f/u.  Patient seen and examined at bedside.  Patient has ALZ/dementia and requires assist with ADLs.  Patient with diagnosis of depression, denies feeling down and thoughts of harming self or others.  HTN,  Denies chest pain and headache.  Patient has comfort meds in place. GDR suggested for Remeron & paxil, however continue as ordered as GDR may cause new or worsening symptoms. No issues per nursing.  Patient has no acute complaints.           Objective  Vital signs: 131/62,60,94%    Physical Exam  Constitutional:       General: She is not in acute distress.  Eyes:      Extraocular Movements: Extraocular movements intact.   Cardiovascular:      Rate and Rhythm: Normal rate and regular rhythm.   Pulmonary:      Effort: Pulmonary effort is normal.      Breath sounds: Normal breath sounds.   Abdominal:      General: Bowel sounds are normal.      Palpations: Abdomen is soft.   Musculoskeletal:      Cervical back: Neck supple.      Right lower leg: No edema.      Left lower leg: No edema.   Neurological:      Mental Status: She is alert.   Psychiatric:         Mood and Affect: Mood normal.         Behavior: Behavior is cooperative.         Assessment/Plan  Problem List Items Addressed This Visit       Essential (primary) hypertension     Blood pressures controlled.    Continue metoprolol.  Monitor BP         Alzheimer's disease, unspecified (CMS/HCC)     Continue Aricept.  Mentation at baseline         Major depressive disorder     Mood is stable.    Continue Remeron, paroxetine, Benzatropine          Medications, treatments, and labs reviewed  Continue medications and treatments as listed in  EMR      Scribe Attestation  I, Cathy Garcia   attest that this documentation has been prepared under the direction and in the presence of Yanet Brand MD.     Provider Attestation - Scribe documentation  All medical record entries made by the Scribe were at my direction and personally dictated by me. I have reviewed the chart and agree that the record accurately reflects my personal performance of the history, physical exam, discussion and plan.   Yanet Brand MD      Electronically Signed By: Yanet Brand MD   12/5/23 12:07 PM

## 2023-12-05 NOTE — PROGRESS NOTES
PROGRESS NOTE    Subjective   Chief complaint: Ella Simms is a 83 y.o. female who is a long term care patient being seen and evaluated for monthly general medical care and follow-up    HPI:  Hospice patient presents for general medical care and f/u.  Patient seen and examined at bedside.  Patient has ALZ/dementia and requires assist with ADLs.  Patient with diagnosis of depression, denies feeling down and thoughts of harming self or others.  HTN,  Denies chest pain and headache.  Patient has comfort meds in place. GDR suggested for Remeron & paxil, however continue as ordered as GDR may cause new or worsening symptoms. No issues per nursing.  Patient has no acute complaints.           Objective   Vital signs: 131/62,60,94%    Physical Exam  Constitutional:       General: She is not in acute distress.  Eyes:      Extraocular Movements: Extraocular movements intact.   Cardiovascular:      Rate and Rhythm: Normal rate and regular rhythm.   Pulmonary:      Effort: Pulmonary effort is normal.      Breath sounds: Normal breath sounds.   Abdominal:      General: Bowel sounds are normal.      Palpations: Abdomen is soft.   Musculoskeletal:      Cervical back: Neck supple.      Right lower leg: No edema.      Left lower leg: No edema.   Neurological:      Mental Status: She is alert.   Psychiatric:         Mood and Affect: Mood normal.         Behavior: Behavior is cooperative.         Assessment/Plan   Problem List Items Addressed This Visit       Essential (primary) hypertension     Blood pressures controlled.    Continue metoprolol.  Monitor BP         Alzheimer's disease, unspecified (CMS/HCC)     Continue Aricept.  Mentation at baseline         Major depressive disorder     Mood is stable.    Continue Remeron, paroxetine, Benzatropine          Medications, treatments, and labs reviewed  Continue medications and treatments as listed in EMR      Scribe Attestation  I, Cathy Garcia   attest that this  documentation has been prepared under the direction and in the presence of Yanet Brand MD.     Provider Attestation - Scribe documentation  All medical record entries made by the Scribe were at my direction and personally dictated by me. I have reviewed the chart and agree that the record accurately reflects my personal performance of the history, physical exam, discussion and plan.   Yanet Brand MD

## 2024-01-08 ENCOUNTER — NURSING HOME VISIT (OUTPATIENT)
Dept: POST ACUTE CARE | Facility: EXTERNAL LOCATION | Age: 84
End: 2024-01-08
Payer: MEDICARE

## 2024-01-08 DIAGNOSIS — F32.9 MAJOR DEPRESSIVE DISORDER, REMISSION STATUS UNSPECIFIED, UNSPECIFIED WHETHER RECURRENT: ICD-10-CM

## 2024-01-08 DIAGNOSIS — I10 ESSENTIAL (PRIMARY) HYPERTENSION: ICD-10-CM

## 2024-01-08 DIAGNOSIS — F02.80 ALZHEIMER'S DISEASE, UNSPECIFIED (MULTI): ICD-10-CM

## 2024-01-08 DIAGNOSIS — G30.9 ALZHEIMER'S DISEASE, UNSPECIFIED (MULTI): ICD-10-CM

## 2024-01-08 PROCEDURE — 99309 SBSQ NF CARE MODERATE MDM 30: CPT | Performed by: INTERNAL MEDICINE

## 2024-01-08 NOTE — LETTER
Patient: Ella Simms  : 1940    Encounter Date: 2024    PROGRESS NOTE    Subjective  Chief complaint: Ella Simms is a 83 y.o. female who is a long term care patient being seen and evaluated for monthly general medical care and follow-up    HPI:  Patient presents for general medical care and f/u.  Patient seen and examined at bedside.  No issues per nursing.  Patient has no acute complaints.  Patient has ALZ/dementia and requires assist with ADLs.    Patient with diagnosis of depression, denies feeling down and thoughts of harming self or others.  HTN, denies chest pain and headache.  No new concerns today.  Denies n/v/f/c pain.            Objective  Vital signs: 122\68,74,94%    Physical Exam  Constitutional:       General: She is not in acute distress.  Eyes:      Extraocular Movements: Extraocular movements intact.   Cardiovascular:      Rate and Rhythm: Normal rate and regular rhythm.   Pulmonary:      Effort: Pulmonary effort is normal.      Breath sounds: Normal breath sounds.   Abdominal:      General: Bowel sounds are normal.      Palpations: Abdomen is soft.   Musculoskeletal:      Cervical back: Neck supple.      Right lower leg: No edema.      Left lower leg: No edema.   Neurological:      Mental Status: She is alert.   Psychiatric:         Mood and Affect: Mood normal.         Behavior: Behavior is cooperative.         Assessment/Plan  Problem List Items Addressed This Visit       Essential (primary) hypertension     Blood pressures controlled.    Continue metoprolol.  Monitor BP         Alzheimer's disease, unspecified (CMS/HCC)     Continue Aricept.  Mentation at baseline         Major depressive disorder     Mood is stable.    Continue Remeron, paroxetine, Benzatropine          Medications, treatments, and labs reviewed  Continue medications and treatments as listed in EMR      Scribe Attestation  I, Cathy Garcia   attest that this documentation has been prepared under  the direction and in the presence of Yanet Brand MD.     Provider Attestation - Scribe documentation  All medical record entries made by the Scribe were at my direction and personally dictated by me. I have reviewed the chart and agree that the record accurately reflects my personal performance of the history, physical exam, discussion and plan.   Yanet Brand MD      Electronically Signed By: Yanet Brand MD   1/9/24  5:13 PM

## 2024-01-09 NOTE — PROGRESS NOTES
PROGRESS NOTE    Subjective   Chief complaint: Ella Simms is a 83 y.o. female who is a long term care patient being seen and evaluated for monthly general medical care and follow-up    HPI:  Patient presents for general medical care and f/u.  Patient seen and examined at bedside.  No issues per nursing.  Patient has no acute complaints.  Patient has ALZ/dementia and requires assist with ADLs.    Patient with diagnosis of depression, denies feeling down and thoughts of harming self or others.  HTN, denies chest pain and headache.  No new concerns today.  Denies n/v/f/c pain.            Objective   Vital signs: 122\68,74,94%    Physical Exam  Constitutional:       General: She is not in acute distress.  Eyes:      Extraocular Movements: Extraocular movements intact.   Cardiovascular:      Rate and Rhythm: Normal rate and regular rhythm.   Pulmonary:      Effort: Pulmonary effort is normal.      Breath sounds: Normal breath sounds.   Abdominal:      General: Bowel sounds are normal.      Palpations: Abdomen is soft.   Musculoskeletal:      Cervical back: Neck supple.      Right lower leg: No edema.      Left lower leg: No edema.   Neurological:      Mental Status: She is alert.   Psychiatric:         Mood and Affect: Mood normal.         Behavior: Behavior is cooperative.         Assessment/Plan   Problem List Items Addressed This Visit       Essential (primary) hypertension     Blood pressures controlled.    Continue metoprolol.  Monitor BP         Alzheimer's disease, unspecified (CMS/HCC)     Continue Aricept.  Mentation at baseline         Major depressive disorder     Mood is stable.    Continue Remeron, paroxetine, Benzatropine          Medications, treatments, and labs reviewed  Continue medications and treatments as listed in EMR      Scribe Attestation  I, Cathy Garcia   attest that this documentation has been prepared under the direction and in the presence of Yanet Brand MD.      Provider Attestation - Scribe documentation  All medical record entries made by the Scribe were at my direction and personally dictated by me. I have reviewed the chart and agree that the record accurately reflects my personal performance of the history, physical exam, discussion and plan.   Yanet Brand MD

## 2024-02-05 ENCOUNTER — NURSING HOME VISIT (OUTPATIENT)
Dept: POST ACUTE CARE | Facility: EXTERNAL LOCATION | Age: 84
End: 2024-02-05
Payer: MEDICARE

## 2024-02-05 DIAGNOSIS — G30.9 ALZHEIMER'S DISEASE, UNSPECIFIED (MULTI): ICD-10-CM

## 2024-02-05 DIAGNOSIS — F02.80 ALZHEIMER'S DISEASE, UNSPECIFIED (MULTI): ICD-10-CM

## 2024-02-05 DIAGNOSIS — F32.9 MAJOR DEPRESSIVE DISORDER, REMISSION STATUS UNSPECIFIED, UNSPECIFIED WHETHER RECURRENT: ICD-10-CM

## 2024-02-05 DIAGNOSIS — I10 ESSENTIAL (PRIMARY) HYPERTENSION: ICD-10-CM

## 2024-02-05 PROCEDURE — 99309 SBSQ NF CARE MODERATE MDM 30: CPT | Performed by: INTERNAL MEDICINE

## 2024-02-05 NOTE — LETTER
Patient: Ella Simms  : 1940    Encounter Date: 2024    PROGRESS NOTE    Subjective  Chief complaint: Ella Simms is a 83 y.o. female who is a long term care patient being seen and evaluated for monthly general medical care and follow-up    HPI:  Patient presents for general medical care and f/u.  Patient seen and examined at bedside.  No issues per nursing.  Patient has no acute complaints.  Patient has ALZ/dementia and requires assist with ADLs.    Patient with diagnosis of depression, denies feeling down and thoughts of harming self or others.  HTN, denies chest pain and headache.  No new concerns today.  Denies n/v/f/c pain.            Objective  Vital signs: 132/76,72,94%    Physical Exam  Constitutional:       General: She is not in acute distress.  Eyes:      Extraocular Movements: Extraocular movements intact.   Cardiovascular:      Rate and Rhythm: Normal rate and regular rhythm.   Pulmonary:      Effort: Pulmonary effort is normal.      Breath sounds: Normal breath sounds.   Abdominal:      General: Bowel sounds are normal.      Palpations: Abdomen is soft.   Musculoskeletal:      Cervical back: Neck supple.      Right lower leg: No edema.      Left lower leg: No edema.   Neurological:      Mental Status: She is alert.   Psychiatric:         Mood and Affect: Mood normal.         Behavior: Behavior is cooperative.         Assessment/Plan  Problem List Items Addressed This Visit       Essential (primary) hypertension     Blood pressures controlled.    Continue metoprolol.  Monitor BP         Alzheimer's disease, unspecified (CMS/HCC)     Continue Aricept.  Mentation at baseline         Major depressive disorder     Mood is stable.    Continue Remeron, paroxetine, Benzatropine          Medications, treatments, and labs reviewed  Continue medications and treatments as listed in EMR      Scribe Attestation  I, Cathy Garcia   attest that this documentation has been prepared under  the direction and in the presence of Yanet Brand MD.     Provider Attestation - Scribe documentation  All medical record entries made by the Scribe were at my direction and personally dictated by me. I have reviewed the chart and agree that the record accurately reflects my personal performance of the history, physical exam, discussion and plan.   Yanet Brand MD      Electronically Signed By: Yanet Brand MD   2/5/24  4:42 PM

## 2024-02-05 NOTE — PROGRESS NOTES
PROGRESS NOTE    Subjective   Chief complaint: Ella Simms is a 83 y.o. female who is a long term care patient being seen and evaluated for monthly general medical care and follow-up    HPI:  Patient presents for general medical care and f/u.  Patient seen and examined at bedside.  No issues per nursing.  Patient has no acute complaints.  Patient has ALZ/dementia and requires assist with ADLs.    Patient with diagnosis of depression, denies feeling down and thoughts of harming self or others.  HTN, denies chest pain and headache.  No new concerns today.  Denies n/v/f/c pain.            Objective   Vital signs: 132/76,72,94%    Physical Exam  Constitutional:       General: She is not in acute distress.  Eyes:      Extraocular Movements: Extraocular movements intact.   Cardiovascular:      Rate and Rhythm: Normal rate and regular rhythm.   Pulmonary:      Effort: Pulmonary effort is normal.      Breath sounds: Normal breath sounds.   Abdominal:      General: Bowel sounds are normal.      Palpations: Abdomen is soft.   Musculoskeletal:      Cervical back: Neck supple.      Right lower leg: No edema.      Left lower leg: No edema.   Neurological:      Mental Status: She is alert.   Psychiatric:         Mood and Affect: Mood normal.         Behavior: Behavior is cooperative.         Assessment/Plan   Problem List Items Addressed This Visit       Essential (primary) hypertension     Blood pressures controlled.    Continue metoprolol.  Monitor BP         Alzheimer's disease, unspecified (CMS/HCC)     Continue Aricept.  Mentation at baseline         Major depressive disorder     Mood is stable.    Continue Remeron, paroxetine, Benzatropine          Medications, treatments, and labs reviewed  Continue medications and treatments as listed in EMR      Scribe Attestation  I, Cathy Garcia   attest that this documentation has been prepared under the direction and in the presence of Yanet Brand MD.      Provider Attestation - Scribe documentation  All medical record entries made by the Scribe were at my direction and personally dictated by me. I have reviewed the chart and agree that the record accurately reflects my personal performance of the history, physical exam, discussion and plan.   Yanet Brand MD

## 2024-03-04 ENCOUNTER — NURSING HOME VISIT (OUTPATIENT)
Dept: POST ACUTE CARE | Facility: EXTERNAL LOCATION | Age: 84
End: 2024-03-04
Payer: MEDICARE

## 2024-03-04 DIAGNOSIS — F32.9 MAJOR DEPRESSIVE DISORDER, REMISSION STATUS UNSPECIFIED, UNSPECIFIED WHETHER RECURRENT: ICD-10-CM

## 2024-03-04 DIAGNOSIS — G30.9 ALZHEIMER'S DISEASE, UNSPECIFIED (MULTI): ICD-10-CM

## 2024-03-04 DIAGNOSIS — I10 ESSENTIAL (PRIMARY) HYPERTENSION: ICD-10-CM

## 2024-03-04 DIAGNOSIS — F02.80 ALZHEIMER'S DISEASE, UNSPECIFIED (MULTI): ICD-10-CM

## 2024-03-04 PROCEDURE — 99309 SBSQ NF CARE MODERATE MDM 30: CPT | Performed by: INTERNAL MEDICINE

## 2024-03-04 NOTE — PROGRESS NOTES
PROGRESS NOTE    Subjective   Chief complaint: Ella Simms is a 84 y.o. female who is a long term care patient being seen and evaluated for monthly general medical care and follow-up    HPI:  Patient presents for general medical care and f/u.  Patient seen and examined at bedside.  No issues per nursing.  Patient has no acute complaints.  Patient has ALZ/dementia and requires assist with ADLs.    Patient with diagnosis of depression, denies feeling down and thoughts of harming self or others.  HTN, denies chest pain and headache.  No new concerns today.  Denies n/v/f/c pain.            Objective   Vital signs: 124\68,64,94%    Physical Exam  Constitutional:       General: She is not in acute distress.  Eyes:      Extraocular Movements: Extraocular movements intact.   Cardiovascular:      Rate and Rhythm: Normal rate and regular rhythm.   Pulmonary:      Effort: Pulmonary effort is normal.      Breath sounds: Normal breath sounds.   Abdominal:      General: Bowel sounds are normal.      Palpations: Abdomen is soft.   Musculoskeletal:      Cervical back: Neck supple.      Right lower leg: No edema.      Left lower leg: No edema.   Neurological:      Mental Status: She is alert.   Psychiatric:         Mood and Affect: Mood normal.         Behavior: Behavior is cooperative.         Assessment/Plan   Problem List Items Addressed This Visit       Essential (primary) hypertension     Blood pressures controlled.    Continue metoprolol.  Monitor BP         Alzheimer's disease, unspecified (CMS/HCC)     Continue Aricept.  Mentation at baseline         Major depressive disorder     Mood is stable.    Continue Remeron, paroxetine, Benzatropine          Medications, treatments, and labs reviewed  Continue medications and treatments as listed in EMR      Scribe Attestation  I, Cathy Garcia   attest that this documentation has been prepared under the direction and in the presence of Yanet Brand MD.      Provider Attestation - Scribe documentation  All medical record entries made by the Scribe were at my direction and personally dictated by me. I have reviewed the chart and agree that the record accurately reflects my personal performance of the history, physical exam, discussion and plan.   Yanet Brand MD

## 2024-04-01 ENCOUNTER — NURSING HOME VISIT (OUTPATIENT)
Dept: POST ACUTE CARE | Facility: EXTERNAL LOCATION | Age: 84
End: 2024-04-01
Payer: MEDICARE

## 2024-04-01 DIAGNOSIS — F32.9 MAJOR DEPRESSIVE DISORDER, REMISSION STATUS UNSPECIFIED, UNSPECIFIED WHETHER RECURRENT: ICD-10-CM

## 2024-04-01 DIAGNOSIS — I10 ESSENTIAL (PRIMARY) HYPERTENSION: ICD-10-CM

## 2024-04-01 DIAGNOSIS — G30.9 ALZHEIMER'S DISEASE, UNSPECIFIED (MULTI): ICD-10-CM

## 2024-04-01 DIAGNOSIS — F02.80 ALZHEIMER'S DISEASE, UNSPECIFIED (MULTI): ICD-10-CM

## 2024-04-01 PROCEDURE — 99309 SBSQ NF CARE MODERATE MDM 30: CPT | Performed by: INTERNAL MEDICINE

## 2024-04-01 NOTE — LETTER
Patient: Ella Simms  : 1940    Encounter Date: 2024    PROGRESS NOTE    Subjective  Chief complaint: Ella Simms is a 84 y.o. female who is a long term care patient being seen and evaluated for monthly general medical care and follow-up    HPI:  Patient presents for general medical care and f/u.  Patient seen and examined at bedside.  No issues per nursing.  Patient has no acute complaints.  Patient has ALZ/dementia and requires assist with ADLs.    Patient with diagnosis of depression, denies feeling down and thoughts of harming self or others.  HTN, denies chest pain and headache.  No new concerns today.  Denies n/v/f/c pain.            Objective  Vital signs: 150/88,60,94%    Physical Exam  Constitutional:       General: She is not in acute distress.  Eyes:      Extraocular Movements: Extraocular movements intact.   Cardiovascular:      Rate and Rhythm: Normal rate and regular rhythm.   Pulmonary:      Effort: Pulmonary effort is normal.      Breath sounds: Normal breath sounds.   Abdominal:      General: Bowel sounds are normal.      Palpations: Abdomen is soft.   Musculoskeletal:      Cervical back: Neck supple.      Right lower leg: No edema.      Left lower leg: No edema.   Neurological:      Mental Status: She is alert.   Psychiatric:         Mood and Affect: Mood normal.         Behavior: Behavior is cooperative.         Assessment/Plan  Problem List Items Addressed This Visit       Essential (primary) hypertension     Blood pressures controlled.    Continue metoprolol.  Monitor BP         Alzheimer's disease, unspecified (CMS/HCC)     Mentation at baseline  Monitor for changes         Major depressive disorder     Mood is stable.    Continue Remeron, paroxetine, Benzatropine          Medications, treatments, and labs reviewed  Continue medications and treatments as listed in EMR      Scribe Attestation  I, Valerie Saini, Cathy   attest that this documentation has been prepared  under the direction and in the presence of Yanet Brand MD.     Provider Attestation - Scribe documentation  All medical record entries made by the Scribe were at my direction and personally dictated by me. I have reviewed the chart and agree that the record accurately reflects my personal performance of the history, physical exam, discussion and plan.   Yanet Brand MD      Electronically Signed By: Yanet Brand MD   4/2/24  5:24 PM

## 2024-04-02 NOTE — PROGRESS NOTES
PROGRESS NOTE    Subjective   Chief complaint: Ella Simms is a 84 y.o. female who is a long term care patient being seen and evaluated for monthly general medical care and follow-up    HPI:  Patient presents for general medical care and f/u.  Patient seen and examined at bedside.  No issues per nursing.  Patient has no acute complaints.  Patient has ALZ/dementia and requires assist with ADLs.    Patient with diagnosis of depression, denies feeling down and thoughts of harming self or others.  HTN, denies chest pain and headache.  No new concerns today.  Denies n/v/f/c pain.            Objective   Vital signs: 150/88,60,94%    Physical Exam  Constitutional:       General: She is not in acute distress.  Eyes:      Extraocular Movements: Extraocular movements intact.   Cardiovascular:      Rate and Rhythm: Normal rate and regular rhythm.   Pulmonary:      Effort: Pulmonary effort is normal.      Breath sounds: Normal breath sounds.   Abdominal:      General: Bowel sounds are normal.      Palpations: Abdomen is soft.   Musculoskeletal:      Cervical back: Neck supple.      Right lower leg: No edema.      Left lower leg: No edema.   Neurological:      Mental Status: She is alert.   Psychiatric:         Mood and Affect: Mood normal.         Behavior: Behavior is cooperative.         Assessment/Plan   Problem List Items Addressed This Visit       Essential (primary) hypertension     Blood pressures controlled.    Continue metoprolol.  Monitor BP         Alzheimer's disease, unspecified (CMS/HCC)     Mentation at baseline  Monitor for changes         Major depressive disorder     Mood is stable.    Continue Remeron, paroxetine, Benzatropine          Medications, treatments, and labs reviewed  Continue medications and treatments as listed in EMR      Scribe Attestation  I, Cathy Garcia   attest that this documentation has been prepared under the direction and in the presence of Yanet Brand MD.      Provider Attestation - Scribe documentation  All medical record entries made by the Scribe were at my direction and personally dictated by me. I have reviewed the chart and agree that the record accurately reflects my personal performance of the history, physical exam, discussion and plan.   Yanet Brand MD

## 2024-05-12 ENCOUNTER — NURSING HOME VISIT (OUTPATIENT)
Dept: POST ACUTE CARE | Facility: EXTERNAL LOCATION | Age: 84
End: 2024-05-12
Payer: MEDICARE

## 2024-05-12 DIAGNOSIS — G30.9 ALZHEIMER'S DISEASE, UNSPECIFIED (MULTI): ICD-10-CM

## 2024-05-12 DIAGNOSIS — F32.9 MAJOR DEPRESSIVE DISORDER, REMISSION STATUS UNSPECIFIED, UNSPECIFIED WHETHER RECURRENT: ICD-10-CM

## 2024-05-12 DIAGNOSIS — I10 ESSENTIAL (PRIMARY) HYPERTENSION: ICD-10-CM

## 2024-05-12 DIAGNOSIS — F02.80 ALZHEIMER'S DISEASE, UNSPECIFIED (MULTI): ICD-10-CM

## 2024-05-12 PROCEDURE — 99309 SBSQ NF CARE MODERATE MDM 30: CPT | Performed by: INTERNAL MEDICINE

## 2024-05-12 NOTE — LETTER
Patient: Ella Simms  : 1940    Encounter Date: 2024    PROGRESS NOTE    Subjective  Chief complaint: Ella Simms is a 84 y.o. female who is a long term care patient being seen and evaluated for monthly general medical care and follow-up    HPI:  Patient presents for general medical care and f/u.  Patient seen and examined at bedside.  No issues per nursing.  Patient has no acute complaints.  Patient has ALZ/dementia and requires assist with ADLs.  Patient with diagnosis of depression, denies feeling down and thoughts of harming self or others.  HTN, denies chest pain and headache.  No new concerns today.  Denies n/v/f/c pain.            Objective  Vital signs: 128/70,70,97%    Physical Exam  Constitutional:       General: She is not in acute distress.  Eyes:      Extraocular Movements: Extraocular movements intact.   Cardiovascular:      Rate and Rhythm: Normal rate and regular rhythm.   Pulmonary:      Effort: Pulmonary effort is normal.      Breath sounds: Normal breath sounds.   Abdominal:      General: Bowel sounds are normal.      Palpations: Abdomen is soft.   Musculoskeletal:      Cervical back: Neck supple.      Right lower leg: No edema.      Left lower leg: No edema.   Neurological:      Mental Status: She is alert.   Psychiatric:         Mood and Affect: Mood normal.         Behavior: Behavior is cooperative.         Assessment/Plan  Problem List Items Addressed This Visit       Essential (primary) hypertension     Blood pressures controlled.    Continue metoprolol.  Monitor BP         Alzheimer's disease, unspecified (Multi)     Mentation at baseline  Monitor for changes         Major depressive disorder     Mood is stable.    Continue Remeron, paroxetine, Benzatropine          Medications, treatments, and labs reviewed  Continue medications and treatments as listed in EMR      Scribe Attestation  I, Cathy Garcia   attest that this documentation has been prepared under  the direction and in the presence of Yanet Brand MD.     Provider Attestation - Scribe documentation  All medical record entries made by the Scribe were at my direction and personally dictated by me. I have reviewed the chart and agree that the record accurately reflects my personal performance of the history, physical exam, discussion and plan.   Yanet Brand MD      Electronically Signed By: Yanet Brand MD   5/13/24  1:31 PM

## 2024-05-13 NOTE — PROGRESS NOTES
PROGRESS NOTE    Subjective   Chief complaint: Ella Simms is a 84 y.o. female who is a long term care patient being seen and evaluated for monthly general medical care and follow-up    HPI:  Patient presents for general medical care and f/u.  Patient seen and examined at bedside.  No issues per nursing.  Patient has no acute complaints.  Patient has ALZ/dementia and requires assist with ADLs.  Patient with diagnosis of depression, denies feeling down and thoughts of harming self or others.  HTN, denies chest pain and headache.  No new concerns today.  Denies n/v/f/c pain.            Objective   Vital signs: 128/70,70,97%    Physical Exam  Constitutional:       General: She is not in acute distress.  Eyes:      Extraocular Movements: Extraocular movements intact.   Cardiovascular:      Rate and Rhythm: Normal rate and regular rhythm.   Pulmonary:      Effort: Pulmonary effort is normal.      Breath sounds: Normal breath sounds.   Abdominal:      General: Bowel sounds are normal.      Palpations: Abdomen is soft.   Musculoskeletal:      Cervical back: Neck supple.      Right lower leg: No edema.      Left lower leg: No edema.   Neurological:      Mental Status: She is alert.   Psychiatric:         Mood and Affect: Mood normal.         Behavior: Behavior is cooperative.         Assessment/Plan   Problem List Items Addressed This Visit       Essential (primary) hypertension     Blood pressures controlled.    Continue metoprolol.  Monitor BP         Alzheimer's disease, unspecified (Multi)     Mentation at baseline  Monitor for changes         Major depressive disorder     Mood is stable.    Continue Remeron, paroxetine, Benzatropine          Medications, treatments, and labs reviewed  Continue medications and treatments as listed in EMR      Scribe Attestation  I, Cathy Garcia   attest that this documentation has been prepared under the direction and in the presence of Yanet Brand MD.     Provider  Attestation - Scribe documentation  All medical record entries made by the Scribe were at my direction and personally dictated by me. I have reviewed the chart and agree that the record accurately reflects my personal performance of the history, physical exam, discussion and plan.   Yanet Brand MD

## 2024-06-10 ENCOUNTER — NURSING HOME VISIT (OUTPATIENT)
Dept: POST ACUTE CARE | Facility: EXTERNAL LOCATION | Age: 84
End: 2024-06-10
Payer: MEDICARE

## 2024-06-10 DIAGNOSIS — F32.9 MAJOR DEPRESSIVE DISORDER, REMISSION STATUS UNSPECIFIED, UNSPECIFIED WHETHER RECURRENT: ICD-10-CM

## 2024-06-10 DIAGNOSIS — G30.9 ALZHEIMER'S DISEASE, UNSPECIFIED (MULTI): ICD-10-CM

## 2024-06-10 DIAGNOSIS — I10 ESSENTIAL (PRIMARY) HYPERTENSION: ICD-10-CM

## 2024-06-10 DIAGNOSIS — F02.80 ALZHEIMER'S DISEASE, UNSPECIFIED (MULTI): ICD-10-CM

## 2024-06-10 PROCEDURE — 99309 SBSQ NF CARE MODERATE MDM 30: CPT | Performed by: INTERNAL MEDICINE

## 2024-06-10 NOTE — PROGRESS NOTES
PROGRESS NOTE    Subjective   Chief complaint: Ella Simms is a 84 y.o. female who is a long term care patient being seen and evaluated for monthly general medical care and follow-up    HPI:  Hospice patient presents for general medical care and f/u.  Patient seen and examined at bedside.  No issues per nursing.  Patient has no acute complaints.  Patient has ALZ/dementia and requires assist with ADLs.  Patient with diagnosis of depression, denies feeling down and thoughts of harming self or others.  HTN, denies chest pain and headache.  No new concerns today.  Denies n/v/f/c pain.       Objective   Vital signs: 128/70,68,97%    Physical Exam  Constitutional:       General: She is not in acute distress.  Eyes:      Extraocular Movements: Extraocular movements intact.   Cardiovascular:      Rate and Rhythm: Normal rate and regular rhythm.   Pulmonary:      Effort: Pulmonary effort is normal.      Breath sounds: Normal breath sounds.   Abdominal:      General: Bowel sounds are normal.      Palpations: Abdomen is soft.   Musculoskeletal:      Cervical back: Neck supple.      Right lower leg: No edema.      Left lower leg: No edema.   Neurological:      Mental Status: She is alert.   Psychiatric:         Mood and Affect: Mood normal.         Behavior: Behavior is cooperative.         Assessment/Plan   Problem List Items Addressed This Visit       Essential (primary) hypertension     Blood pressures controlled.    Continue metoprolol.  Monitor BP         Alzheimer's disease, unspecified (Multi)     Mentation at baseline  Monitor for changes  Hospice          Major depressive disorder     Mood is stable.    Continue Remeron, paroxetine, Benzatropine          Medications, treatments, and labs reviewed  Continue medications and treatments as listed in EMR      Scribe Attestation  I, Cathy Garcia   attest that this documentation has been prepared under the direction and in the presence of Yanet Brand MD.      Provider Attestation - Scribe documentation  All medical record entries made by the Scribe were at my direction and personally dictated by me. I have reviewed the chart and agree that the record accurately reflects my personal performance of the history, physical exam, discussion and plan.   Yanet Brand MD

## 2024-06-10 NOTE — LETTER
Patient: Ella Simms  : 1940    Encounter Date: 06/10/2024    PROGRESS NOTE    Subjective  Chief complaint: Ella Simms is a 84 y.o. female who is a long term care patient being seen and evaluated for monthly general medical care and follow-up    HPI:  Hospice patient presents for general medical care and f/u.  Patient seen and examined at bedside.  No issues per nursing.  Patient has no acute complaints.  Patient has ALZ/dementia and requires assist with ADLs.  Patient with diagnosis of depression, denies feeling down and thoughts of harming self or others.  HTN, denies chest pain and headache.  No new concerns today.  Denies n/v/f/c pain.       Objective  Vital signs: 128/70,68,97%    Physical Exam  Constitutional:       General: She is not in acute distress.  Eyes:      Extraocular Movements: Extraocular movements intact.   Cardiovascular:      Rate and Rhythm: Normal rate and regular rhythm.   Pulmonary:      Effort: Pulmonary effort is normal.      Breath sounds: Normal breath sounds.   Abdominal:      General: Bowel sounds are normal.      Palpations: Abdomen is soft.   Musculoskeletal:      Cervical back: Neck supple.      Right lower leg: No edema.      Left lower leg: No edema.   Neurological:      Mental Status: She is alert.   Psychiatric:         Mood and Affect: Mood normal.         Behavior: Behavior is cooperative.         Assessment/Plan  Problem List Items Addressed This Visit       Essential (primary) hypertension     Blood pressures controlled.    Continue metoprolol.  Monitor BP         Alzheimer's disease, unspecified (Multi)     Mentation at baseline  Monitor for changes  Hospice          Major depressive disorder     Mood is stable.    Continue Remeron, paroxetine, Benzatropine          Medications, treatments, and labs reviewed  Continue medications and treatments as listed in EMR      Scribe Attestation  I, Valerie Saini, Carlineibmegan   attest that this documentation has been  prepared under the direction and in the presence of Yanet Brand MD.     Provider Attestation - Scribe documentation  All medical record entries made by the Scribe were at my direction and personally dictated by me. I have reviewed the chart and agree that the record accurately reflects my personal performance of the history, physical exam, discussion and plan.   Yanet Brand MD      Electronically Signed By: Yanet Brand MD   6/10/24  2:44 PM

## 2024-06-17 ENCOUNTER — NURSING HOME VISIT (OUTPATIENT)
Dept: POST ACUTE CARE | Facility: EXTERNAL LOCATION | Age: 84
End: 2024-06-17
Payer: MEDICARE

## 2024-06-17 VITALS
DIASTOLIC BLOOD PRESSURE: 70 MMHG | SYSTOLIC BLOOD PRESSURE: 127 MMHG | BODY MASS INDEX: 21.1 KG/M2 | HEIGHT: 60 IN | WEIGHT: 107.5 LBS

## 2024-06-17 DIAGNOSIS — Z00.00 ENCOUNTER FOR ANNUAL WELLNESS VISIT (AWV) IN MEDICARE PATIENT: ICD-10-CM

## 2024-06-17 DIAGNOSIS — E46 MALNUTRITION, UNSPECIFIED TYPE (MULTI): ICD-10-CM

## 2024-06-17 DIAGNOSIS — F02.80 ALZHEIMER'S DISEASE, UNSPECIFIED (MULTI): ICD-10-CM

## 2024-06-17 DIAGNOSIS — I10 ESSENTIAL (PRIMARY) HYPERTENSION: ICD-10-CM

## 2024-06-17 DIAGNOSIS — F03.90 DEMENTIA, UNSPECIFIED DEMENTIA SEVERITY, UNSPECIFIED DEMENTIA TYPE, UNSPECIFIED WHETHER BEHAVIORAL, PSYCHOTIC, OR MOOD DISTURBANCE OR ANXIETY (MULTI): ICD-10-CM

## 2024-06-17 DIAGNOSIS — G30.9 ALZHEIMER'S DISEASE, UNSPECIFIED (MULTI): ICD-10-CM

## 2024-06-17 DIAGNOSIS — F32.9 MAJOR DEPRESSIVE DISORDER, REMISSION STATUS UNSPECIFIED, UNSPECIFIED WHETHER RECURRENT: ICD-10-CM

## 2024-06-17 PROCEDURE — G0439 PPPS, SUBSEQ VISIT: HCPCS | Performed by: INTERNAL MEDICINE

## 2024-06-17 PROCEDURE — 99309 SBSQ NF CARE MODERATE MDM 30: CPT | Performed by: INTERNAL MEDICINE

## 2024-06-17 NOTE — PROGRESS NOTES
ANNUAL WELLNESS VISIT    Subjective :  Chief Complaint: Ella Simms is an 84 y.o.   Non- female who presents for annual wellness visit, general medical care, and follow-up chronic conditions.    HPI:  HPI  Patient seen and examined at bedside for annual wellness visit and physical exam. Patient at baseline and denies new complaints. No new issues of concern according to nurse. No acute distress.       Past Surgical History:   Procedure Laterality Date   • COLONOSCOPY  11/04/2013    Complete Colonoscopy       No family history on file.    Social History     Socioeconomic History   • Marital status:      Spouse name: Not on file   • Number of children: Not on file   • Years of education: Not on file   • Highest education level: Not on file   Occupational History   • Not on file   Tobacco Use   • Smoking status: Not on file   • Smokeless tobacco: Not on file   Substance and Sexual Activity   • Alcohol use: Not on file   • Drug use: Not on file   • Sexual activity: Not on file   Other Topics Concern   • Not on file   Social History Narrative   • Not on file     Social Determinants of Health     Financial Resource Strain: Low Risk  (1/23/2020)    Received from [facility]    Overall Financial Resource Strain (CARDIA)    • Difficulty of Paying Living Expenses: Not hard at all   Food Insecurity: No Food Insecurity (1/23/2020)    Received from [facility]    Hunger Vital Sign    • Worried About Running Out of Food in the Last Year: Never true    • Ran Out of Food in the Last Year: Never true   Transportation Needs: No Transportation Needs (1/23/2020)    Received from [facility]    PRAPARE - Transportation    • Lack of Transportation (Medical): No    • Lack of Transportation (Non-Medical): No   Physical Activity: Not on file   Stress: Not on file   Social Connections: Not on file   Intimate Partner Violence: Not on file   Housing Stability: Not on file       Objective   BP  127/70   Ht 1.524 m (5')   Wt 48.8 kg (107 lb 8 oz)   BMI 20.99 kg/m²     Physical Exam  Constitutional:       General: She is not in acute distress.  Eyes:      Extraocular Movements: Extraocular movements intact.   Pulmonary:      Effort: Pulmonary effort is normal.   Musculoskeletal:      Cervical back: Neck supple.   Neurological:      Mental Status: She is alert.   Psychiatric:         Mood and Affect: Mood normal.         Behavior: Behavior is cooperative.       Imaging:  No results found.     Labs reviewed:    Lab Results   Component Value Date    WBC 7.7 09/08/2022    HGB 9.2 (L) 09/08/2022    HCT 32.6 (L) 09/08/2022     09/08/2022    ALT 14 09/08/2022    AST 15 09/08/2022     (H) 09/08/2022    K 4.1 09/08/2022     (H) 09/08/2022    CREATININE 0.98 09/08/2022    BUN 29 (H) 09/08/2022    CO2 32 09/08/2022    TSH 1.35 08/01/2019    INR 1.1 04/13/2020    HGBA1C 8.2 04/10/2018       Past Medical, Surgical, and Family History reviewed and updated in chart  Allergies reviewed and updated in facility record  All medications and treatments were reviewed in the local facility record  Nursing assessments/notes were reviewed in the local facility record    List of current healthcare providers:  Patient Care Team:  Yanet Brand MD as PCP - General  Yanet Brand MD as PCP - [facility] PCP     HRA:  Over the past 2 weeks, how often have you been bothered by any of the following problems?  Little interest or pleasure in doing things: Not at all  Feeling down, depressed, or hopeless: Not at all  Patient Health Questionnaire-2 Score: 0    Steadi Fall Risk  One or more falls in the last year? No  How many Times?    Was the patient injured in the fall?    Has trouble stepping onto curb? Comment:non amb  Advised to use a cane or walker to get around safely? No  Often has to rush to toilet? No  Feels unsteady when walking? No  Comment:non amb  Has lost some feeling in feet? Yes  Often feels sad or  depressed? No  Steadies self on furniture while walking at home? No  Takes medicine that makes them feel lightheaded or more tired than usual? Yes  Worried about Falling? No  Takes medicine to sleep or improve mood? Yes  Needs to push with hands when rising from a chair? No            Falls Home Safety Risk Factors  Home Safety Risk Factors: None    Functional Ability/Level of Safety  Cognitive Impairment Observed: Cognitive impairment observed  Cognitive Impairment Reported: Cognitive impairment reported by patient or family    Patient Self Assessment of Health Status  Patient Self Assessment: Poor    Nutrition and Exercise  Current Diet: Well Balanced Diet  Adequate Fluid Intake: No  Caffeine: Yes  Exercise Frequency: No Exercise    ADL Screening  Hearing - Right Ear: Difficulty with noise  Hearing - Left Ear: Difficulty with noise  Bathing: Dependent  Dressing: Dependent  Walks in Home: Dependent    IADL's  Managing Finances: Total care  Grocery Shopping: Total care  Taking Medication: Total care  Doing Housework: Total care    Psychosocial risks:  Do you feel sad - no  Do you feel stressed - no  Do you feel lonely - no  Do you feel fatigued - no  Do you have any pain - no    Assessment/Plan :  Problem List Items Addressed This Visit       Essential (primary) hypertension     Blood pressures controlled.    Continue metoprolol.  Monitor BP         Alzheimer's disease, unspecified (Multi)     Mentation at baseline  Monitor for changes  Hospice          Major depressive disorder     Mood is stable.    Continue Remeron, paroxetine, Benzatropine         Malnutrition (Multi)    Dementia (Multi)     Mentation at baseline  Monitor for chages          Encounter for annual wellness visit (AWV) in Medicare patient     The following health maintenance schedule was reviewed with the patient and provided in printed form in the after visit summary:  Health Maintenance   Topic Date Due   • Lipid Panel  07/02/2024 (Originally  1940)   • Influenza Vaccine (Season Ended) 09/01/2024   • Medicare Annual Wellness Visit (AWV)  06/18/2025   • DTaP/Tdap/Td Vaccines (2 - Td or Tdap) 02/14/2033   • Pneumococcal Vaccine: 65+ Years  Completed   • HIB Vaccines  Aged Out   • Hepatitis B Vaccines  Aged Out   • IPV Vaccines  Aged Out   • Hepatitis A Vaccines  Aged Out   • Meningococcal Vaccine  Aged Out   • Rotavirus Vaccines  Aged Out   • HPV Vaccines  Aged Out   • RSV Pregnant patients and/or  patients aged 60+ years  Discontinued   • Zoster Vaccines  Discontinued   • Bone Density Scan  Discontinued   • COVID-19 Vaccine  Discontinued     Will obtain the following labs at nursing facility:  BMP     Continue current medications as listed in facility EMR  Time spent 30 minutes (61187)   Scribe Attestation  I, Cathy Weller   attest that this documentation has been prepared under the direction and in the presence of Yanet Brand MD.    Provider Attestation - Scribe documentation  All medical record entries made by the Scribe were at my direction and personally dictated by me. I have reviewed the chart and agree that the record accurately reflects my personal performance of the history, physical exam, discussion and plan.

## 2024-06-17 NOTE — Clinical Note
Patient: Ella Simms  : 1940    Encounter Date: 2024    ANNUAL WELLNESS VISIT    Subjective:  Chief Complaint: Ella Simms is an 84 y.o.   Non- female who presents for annual wellness visit, general medical care, and follow-up chronic conditions.    HPI:  HPI  Patient seen and examined at bedside for annual wellness visit and physical exam. Patient at baseline and denies new complaints. No new issues of concern according to nurse. No acute distress.       Past Surgical History:   Procedure Laterality Date   • COLONOSCOPY  2013    Complete Colonoscopy       No family history on file.    Social History     Socioeconomic History   • Marital status:      Spouse name: Not on file   • Number of children: Not on file   • Years of education: Not on file   • Highest education level: Not on file   Occupational History   • Not on file   Tobacco Use   • Smoking status: Not on file   • Smokeless tobacco: Not on file   Substance and Sexual Activity   • Alcohol use: Not on file   • Drug use: Not on file   • Sexual activity: Not on file   Other Topics Concern   • Not on file   Social History Narrative   • Not on file     Social Determinants of Health     Financial Resource Strain: Low Risk  (2020)    Received from [facility]    Overall Financial Resource Strain (CARDIA)    • Difficulty of Paying Living Expenses: Not hard at all   Food Insecurity: No Food Insecurity (2020)    Received from [facility]    Hunger Vital Sign    • Worried About Running Out of Food in the Last Year: Never true    • Ran Out of Food in the Last Year: Never true   Transportation Needs: No Transportation Needs (2020)    Received from [facility]    PRAPARE - Transportation    • Lack of Transportation (Medical): No    • Lack of Transportation (Non-Medical): No   Physical Activity: Not on file   Stress: Not on file   Social Connections: Not on file   Intimate Partner  Violence: Not on file   Housing Stability: Not on file       Objective  /70   Ht 1.524 m (5')   Wt 48.8 kg (107 lb 8 oz)   BMI 20.99 kg/m²     Physical Exam  Constitutional:       General: She is not in acute distress.  Eyes:      Extraocular Movements: Extraocular movements intact.   Pulmonary:      Effort: Pulmonary effort is normal.   Musculoskeletal:      Cervical back: Neck supple.   Neurological:      Mental Status: She is alert.   Psychiatric:         Mood and Affect: Mood normal.         Behavior: Behavior is cooperative.       Imaging:  No results found.     Labs reviewed:    Lab Results   Component Value Date    WBC 7.7 09/08/2022    HGB 9.2 (L) 09/08/2022    HCT 32.6 (L) 09/08/2022     09/08/2022    ALT 14 09/08/2022    AST 15 09/08/2022     (H) 09/08/2022    K 4.1 09/08/2022     (H) 09/08/2022    CREATININE 0.98 09/08/2022    BUN 29 (H) 09/08/2022    CO2 32 09/08/2022    TSH 1.35 08/01/2019    INR 1.1 04/13/2020    HGBA1C 8.2 04/10/2018       Past Medical, Surgical, and Family History reviewed and updated in chart  Allergies reviewed and updated in facility record  All medications and treatments were reviewed in the local facility record  Nursing assessments/notes were reviewed in the local facility record    List of current healthcare providers:  Patient Care Team:  Yanet Brand MD as PCP - General  Yanet Brand MD as PCP - [facility] PCP     HRA:  Over the past 2 weeks, how often have you been bothered by any of the following problems?  Little interest or pleasure in doing things: Not at all  Feeling down, depressed, or hopeless: Not at all  Patient Health Questionnaire-2 Score: 0    Steadi Fall Risk  One or more falls in the last year? No  How many Times?    Was the patient injured in the fall?    Has trouble stepping onto curb? Comment:non amb  Advised to use a cane or walker to get around safely? No  Often has to rush to toilet? No  Feels unsteady when walking?  No  Comment:non amb  Has lost some feeling in feet? Yes  Often feels sad or depressed? No  Steadies self on furniture while walking at home? No  Takes medicine that makes them feel lightheaded or more tired than usual? Yes  Worried about Falling? No  Takes medicine to sleep or improve mood? Yes  Needs to push with hands when rising from a chair? No            Falls Home Safety Risk Factors  Home Safety Risk Factors: None    Functional Ability/Level of Safety  Cognitive Impairment Observed: Cognitive impairment observed  Cognitive Impairment Reported: Cognitive impairment reported by patient or family    Patient Self Assessment of Health Status  Patient Self Assessment: Poor    Nutrition and Exercise  Current Diet: Well Balanced Diet  Adequate Fluid Intake: No  Caffeine: Yes  Exercise Frequency: No Exercise    ADL Screening  Hearing - Right Ear: Difficulty with noise  Hearing - Left Ear: Difficulty with noise  Bathing: Dependent  Dressing: Dependent  Walks in Home: Dependent    IADL's  Managing Finances: Total care  Grocery Shopping: Total care  Taking Medication: Total care  Doing Housework: Total care    Psychosocial risks:  Do you feel sad - no  Do you feel stressed - no  Do you feel lonely - no  Do you feel fatigued - no  Do you have any pain - no    Assessment/Plan:  Problem List Items Addressed This Visit       Essential (primary) hypertension     Blood pressures controlled.    Continue metoprolol.  Monitor BP         Alzheimer's disease, unspecified (Multi)     Mentation at baseline  Monitor for changes  Hospice          Major depressive disorder     Mood is stable.    Continue Remeron, paroxetine, Benzatropine         Malnutrition (Multi)    Dementia (Multi)     Mentation at baseline  Monitor for chages          Encounter for annual wellness visit (AWV) in Medicare patient     The following health maintenance schedule was reviewed with the patient and provided in printed form in the after visit  summary:  Health Maintenance   Topic Date Due   • Lipid Panel  07/02/2024 (Originally 1940)   • Influenza Vaccine (Season Ended) 09/01/2024   • Medicare Annual Wellness Visit (AWV)  06/18/2025   • DTaP/Tdap/Td Vaccines (2 - Td or Tdap) 02/14/2033   • Pneumococcal Vaccine: 65+ Years  Completed   • HIB Vaccines  Aged Out   • Hepatitis B Vaccines  Aged Out   • IPV Vaccines  Aged Out   • Hepatitis A Vaccines  Aged Out   • Meningococcal Vaccine  Aged Out   • Rotavirus Vaccines  Aged Out   • HPV Vaccines  Aged Out   • RSV Pregnant patients and/or  patients aged 60+ years  Discontinued   • Zoster Vaccines  Discontinued   • Bone Density Scan  Discontinued   • COVID-19 Vaccine  Discontinued     Will obtain the following labs at nursing facility:  BMP     Continue current medications as listed in facility EMR  Time spent 30 minutes (89389)   Scribe Attestation  Ani HOLLIS Scribe   attest that this documentation has been prepared under the direction and in the presence of Yanet Brand MD.    Provider Attestation - Scribe documentation  All medical record entries made by the Scribe were at my direction and personally dictated by me. I have reviewed the chart and agree that the record accurately reflects my personal performance of the history, physical exam, discussion and plan.            Electronically Signed By: Yanet Brand MD   7/15/24 11:10 AM

## 2024-06-27 PROBLEM — Z00.00 ENCOUNTER FOR ANNUAL WELLNESS VISIT (AWV) IN MEDICARE PATIENT: Status: ACTIVE | Noted: 2024-06-27

## 2024-06-27 ASSESSMENT — PATIENT HEALTH QUESTIONNAIRE - PHQ9
2. FEELING DOWN, DEPRESSED OR HOPELESS: NOT AT ALL
1. LITTLE INTEREST OR PLEASURE IN DOING THINGS: NOT AT ALL
SUM OF ALL RESPONSES TO PHQ9 QUESTIONS 1 AND 2: 0

## 2024-06-27 ASSESSMENT — ACTIVITIES OF DAILY LIVING (ADL)
MANAGING_FINANCES: TOTAL CARE
DOING_HOUSEWORK: TOTAL CARE
GROCERY_SHOPPING: TOTAL CARE
TAKING_MEDICATION: TOTAL CARE
DRESSING: DEPENDENT
BATHING: DEPENDENT

## 2024-06-27 ASSESSMENT — ENCOUNTER SYMPTOMS
DEPRESSION: 0
LOSS OF SENSATION IN FEET: 1
OCCASIONAL FEELINGS OF UNSTEADINESS: 0

## 2024-08-19 ENCOUNTER — NURSING HOME VISIT (OUTPATIENT)
Dept: POST ACUTE CARE | Facility: EXTERNAL LOCATION | Age: 84
End: 2024-08-19
Payer: MEDICARE

## 2024-08-19 DIAGNOSIS — F02.80 ALZHEIMER'S DISEASE, UNSPECIFIED (MULTI): Primary | ICD-10-CM

## 2024-08-19 DIAGNOSIS — I10 ESSENTIAL (PRIMARY) HYPERTENSION: ICD-10-CM

## 2024-08-19 DIAGNOSIS — G30.9 ALZHEIMER'S DISEASE, UNSPECIFIED (MULTI): Primary | ICD-10-CM

## 2024-08-19 DIAGNOSIS — F32.9 MAJOR DEPRESSIVE DISORDER, REMISSION STATUS UNSPECIFIED, UNSPECIFIED WHETHER RECURRENT: ICD-10-CM

## 2024-08-19 DIAGNOSIS — R62.7 FAILURE TO THRIVE IN ADULT: ICD-10-CM

## 2024-08-19 PROCEDURE — 99309 SBSQ NF CARE MODERATE MDM 30: CPT | Performed by: INTERNAL MEDICINE

## 2024-08-19 NOTE — PROGRESS NOTES
PROGRESS NOTE    Subjective   Chief complaint: Ella Simms is a 84 y.o. female who is a long term care patient being seen and evaluated for monthly general medical care and follow-up    HPI:  HPI  Patient presents for general medical care and f/u.  Patient seen and examined at bedside.  No issues per nursing.  Patient has no acute complaints.  Patient is on hospice for comfort care measures patient with diagnosis of depression.  Mood is stable.  Denies feeling down and thoughts of harming self or others.  HTN BP at goal.  Denies chest pain and headache.  Mentation at baseline, no acute distress.    Objective   Vital signs: 110/64, 98.2, 60, 16, 97%    Physical Exam  Constitutional:       General: She is not in acute distress.  Eyes:      Extraocular Movements: Extraocular movements intact.   Pulmonary:      Effort: Pulmonary effort is normal.   Musculoskeletal:      Cervical back: Neck supple.   Neurological:      Mental Status: She is alert.   Psychiatric:         Mood and Affect: Mood normal.         Behavior: Behavior is cooperative.         Assessment/Plan   Problem List Items Addressed This Visit       Essential (primary) hypertension     Monitor blood pressure  Metoprolol         Alzheimer's disease, unspecified (Multi) - Primary     Assist with ADLs and care.  Monitor for changes         Major depressive disorder     Monitor mood and behaviors.  Continue antidepressants         Failure to thrive in adult     Hospice for comfort care measures          Medications, treatments, and labs reviewed  Continue medications and treatments as listed in EMR    Scribe Attestation  I, Cathy Savage   attest that this documentation has been prepared under the direction and in the presence of Yanet Brand MD    Provider Attestation - Scribe documentation  All medical record entries made by the Scribe were at my direction and personally dictated by me. I have reviewed the chart and agree that the record  accurately reflects my personal performance of the history, physical exam, discussion and plan.   Yanet Brand MD

## 2024-08-19 NOTE — LETTER
Patient: Ella Simms  : 1940    Encounter Date: 2024    PROGRESS NOTE    Subjective  Chief complaint: Ella Simms is a 84 y.o. female who is a long term care patient being seen and evaluated for monthly general medical care and follow-up    HPI:  HPI  Patient presents for general medical care and f/u.  Patient seen and examined at bedside.  No issues per nursing.  Patient has no acute complaints.  Patient is on hospice for comfort care measures patient with diagnosis of depression.  Mood is stable.  Denies feeling down and thoughts of harming self or others.  HTN BP at goal.  Denies chest pain and headache.  Mentation at baseline, no acute distress.    Objective  Vital signs: 110/64, 98.2, 60, 16, 97%    Physical Exam  Constitutional:       General: She is not in acute distress.  Eyes:      Extraocular Movements: Extraocular movements intact.   Pulmonary:      Effort: Pulmonary effort is normal.   Musculoskeletal:      Cervical back: Neck supple.   Neurological:      Mental Status: She is alert.   Psychiatric:         Mood and Affect: Mood normal.         Behavior: Behavior is cooperative.         Assessment/Plan  Problem List Items Addressed This Visit       Essential (primary) hypertension     Monitor blood pressure  Metoprolol         Alzheimer's disease, unspecified (Multi) - Primary     Assist with ADLs and care.  Monitor for changes         Major depressive disorder     Monitor mood and behaviors.  Continue antidepressants         Failure to thrive in adult     Hospice for comfort care measures          Medications, treatments, and labs reviewed  Continue medications and treatments as listed in EMR    Scribe Attestation  I, Cathy Savage   attest that this documentation has been prepared under the direction and in the presence of Yanet Brand MD    Provider Attestation - Scribe documentation  All medical record entries made by the Scribmegan were at my direction and personally  dictated by me. I have reviewed the chart and agree that the record accurately reflects my personal performance of the history, physical exam, discussion and plan.   Yanet Brand MD            Electronically Signed By: Yanet Brand MD   8/19/24  2:12 PM   Coumadin resumed/ aspirin on hold  Continue to monitor INR  H/H stable

## 2024-09-01 ENCOUNTER — NURSING HOME VISIT (OUTPATIENT)
Dept: POST ACUTE CARE | Facility: EXTERNAL LOCATION | Age: 84
End: 2024-09-01
Payer: MEDICARE

## 2024-09-01 DIAGNOSIS — I10 ESSENTIAL (PRIMARY) HYPERTENSION: ICD-10-CM

## 2024-09-01 DIAGNOSIS — F02.80 ALZHEIMER'S DISEASE, UNSPECIFIED (MULTI): Primary | ICD-10-CM

## 2024-09-01 DIAGNOSIS — G30.9 ALZHEIMER'S DISEASE, UNSPECIFIED (MULTI): Primary | ICD-10-CM

## 2024-09-01 DIAGNOSIS — F32.9 MAJOR DEPRESSIVE DISORDER, REMISSION STATUS UNSPECIFIED, UNSPECIFIED WHETHER RECURRENT: ICD-10-CM

## 2024-09-01 PROCEDURE — 99308 SBSQ NF CARE LOW MDM 20: CPT | Performed by: INTERNAL MEDICINE

## 2024-09-01 NOTE — LETTER
Patient: Ella Simms  : 1940    Encounter Date: 2024    PROGRESS NOTE    Subjective  Chief complaint: Ella Simms is a 84 y.o. female who is a long term care patient being seen and evaluated for monthly general medical care and follow-up    HPI:  HPI  Patient presents for general medical care and f/u.  Patient seen and examined at bedside.  No issues per nursing.  Patient has no acute complaints.  Patient has dementia and requires assist with ADLs.  HTN BP at goal.  Denies chest pain and headache.  Patient with diagnosis of depression.  Mood is stable.  Denies feeling down and thoughts of harming self or others.  Mentation at baseline, no acute distress.    Objective  Vital signs: 146/83, 98.2, 62, 16, 97%    Physical Exam  Constitutional:       General: She is not in acute distress.  Eyes:      Extraocular Movements: Extraocular movements intact.   Pulmonary:      Effort: Pulmonary effort is normal.   Musculoskeletal:      Cervical back: Neck supple.   Neurological:      Mental Status: She is alert.   Psychiatric:         Mood and Affect: Mood normal.         Behavior: Behavior is cooperative.         Assessment/Plan  Problem List Items Addressed This Visit       Essential (primary) hypertension     Monitor blood pressure  Metoprolol         Alzheimer's disease, unspecified (Multi) - Primary     Assist with ADLs and care.  Monitor for changes         Major depressive disorder     Monitor mood and behaviors.  Continue antidepressants          Medications, treatments, and labs reviewed  Continue medications and treatments as listed in EMR    Scribe Attestation  Shani HOLLIS Scribe   attest that this documentation has been prepared under the direction and in the presence of Yanet Brand MD    Provider Attestation - Scribe documentation  All medical record entries made by the Scribe were at my direction and personally dictated by me. I have reviewed the chart and agree that the record  accurately reflects my personal performance of the history, physical exam, discussion and plan.   Yanet Brand MD            Electronically Signed By: Yanet Brand MD   9/5/24 10:24 PM

## 2024-09-03 NOTE — PROGRESS NOTES
PROGRESS NOTE    Subjective   Chief complaint: Ella Simms is a 84 y.o. female who is a long term care patient being seen and evaluated for monthly general medical care and follow-up    HPI:  HPI  Patient presents for general medical care and f/u.  Patient seen and examined at bedside.  No issues per nursing.  Patient has no acute complaints.  Patient has dementia and requires assist with ADLs.  HTN BP at goal.  Denies chest pain and headache.  Patient with diagnosis of depression.  Mood is stable.  Denies feeling down and thoughts of harming self or others.  Mentation at baseline, no acute distress.    Objective   Vital signs: 146/83, 98.2, 62, 16, 97%    Physical Exam  Constitutional:       General: She is not in acute distress.  Eyes:      Extraocular Movements: Extraocular movements intact.   Pulmonary:      Effort: Pulmonary effort is normal.   Musculoskeletal:      Cervical back: Neck supple.   Neurological:      Mental Status: She is alert.   Psychiatric:         Mood and Affect: Mood normal.         Behavior: Behavior is cooperative.         Assessment/Plan   Problem List Items Addressed This Visit       Essential (primary) hypertension     Monitor blood pressure  Metoprolol         Alzheimer's disease, unspecified (Multi) - Primary     Assist with ADLs and care.  Monitor for changes         Major depressive disorder     Monitor mood and behaviors.  Continue antidepressants          Medications, treatments, and labs reviewed  Continue medications and treatments as listed in EMR    Scribe Attestation  I, Cathy Savage   attest that this documentation has been prepared under the direction and in the presence of Yanet Brand MD    Provider Attestation - Scribe documentation  All medical record entries made by the Scribe were at my direction and personally dictated by me. I have reviewed the chart and agree that the record accurately reflects my personal performance of the history, physical  exam, discussion and plan.   Yanet Brand MD

## 2024-10-14 ENCOUNTER — NURSING HOME VISIT (OUTPATIENT)
Dept: POST ACUTE CARE | Facility: EXTERNAL LOCATION | Age: 84
End: 2024-10-14
Payer: MEDICARE

## 2024-10-14 DIAGNOSIS — F02.80 ALZHEIMER'S DISEASE, UNSPECIFIED: Primary | ICD-10-CM

## 2024-10-14 DIAGNOSIS — I10 ESSENTIAL (PRIMARY) HYPERTENSION: ICD-10-CM

## 2024-10-14 DIAGNOSIS — G30.9 ALZHEIMER'S DISEASE, UNSPECIFIED: Primary | ICD-10-CM

## 2024-10-14 DIAGNOSIS — F32.9 MAJOR DEPRESSIVE DISORDER, REMISSION STATUS UNSPECIFIED, UNSPECIFIED WHETHER RECURRENT: ICD-10-CM

## 2024-10-14 NOTE — PROGRESS NOTES
PROGRESS NOTE    Subjective   Chief complaint: Ella Simms is a 84 y.o. female who is a long term care patient being seen and evaluated for monthly general medical care and follow-up    HPI:  HPI  Patient presents for general medical care and f/u.  Patient seen and examined at bedside.  No issues per nursing.  Patient has no acute complaints.  Patient has dementia and requires assist with ADLs.  HTN BP at goal.  Denies chest pain and headache.  Patient with diagnosis of depression.  Mood is stable.  Denies feeling down and thoughts of harming self or others.  Mentation at baseline, no acute distress.    Objective   Vital signs: 132/74, 98.2, 78, 16, 97%    Physical Exam  Constitutional:       General: She is not in acute distress.  Eyes:      Extraocular Movements: Extraocular movements intact.   Pulmonary:      Effort: Pulmonary effort is normal.   Musculoskeletal:      Cervical back: Neck supple.   Neurological:      Mental Status: She is alert.   Psychiatric:         Mood and Affect: Mood normal.         Behavior: Behavior is cooperative.         Assessment/Plan   Problem List Items Addressed This Visit       Essential (primary) hypertension     Monitor blood pressure  Metoprolol         Alzheimer's disease, unspecified - Primary     Assist with ADLs and care.  Monitor for changes         Major depressive disorder     Monitor mood and behaviors.  Continue antidepressants          Medications, treatments, and labs reviewed  Continue medications and treatments as listed in EMR    Scribe Attestation  I, Cathy Savage   attest that this documentation has been prepared under the direction and in the presence of Yanet Brand MD    Provider Attestation - Scribe documentation  All medical record entries made by the Scribe were at my direction and personally dictated by me. I have reviewed the chart and agree that the record accurately reflects my personal performance of the history, physical exam,  discussion and plan.   Yanet Brand MD

## 2024-10-14 NOTE — LETTER
Patient: Ella Simms  : 1940    Encounter Date: 10/14/2024    PROGRESS NOTE    Subjective  Chief complaint: Ella Simms is a 84 y.o. female who is a long term care patient being seen and evaluated for monthly general medical care and follow-up    HPI:  HPI  Patient presents for general medical care and f/u.  Patient seen and examined at bedside.  No issues per nursing.  Patient has no acute complaints.  Patient has dementia and requires assist with ADLs.  HTN BP at goal.  Denies chest pain and headache.  Patient with diagnosis of depression.  Mood is stable.  Denies feeling down and thoughts of harming self or others.  Mentation at baseline, no acute distress.    Objective  Vital signs: 132/74, 98.2, 78, 16, 97%    Physical Exam  Constitutional:       General: She is not in acute distress.  Eyes:      Extraocular Movements: Extraocular movements intact.   Pulmonary:      Effort: Pulmonary effort is normal.   Musculoskeletal:      Cervical back: Neck supple.   Neurological:      Mental Status: She is alert.   Psychiatric:         Mood and Affect: Mood normal.         Behavior: Behavior is cooperative.         Assessment/Plan  Problem List Items Addressed This Visit       Essential (primary) hypertension     Monitor blood pressure  Metoprolol         Alzheimer's disease, unspecified - Primary     Assist with ADLs and care.  Monitor for changes         Major depressive disorder     Monitor mood and behaviors.  Continue antidepressants          Medications, treatments, and labs reviewed  Continue medications and treatments as listed in EMR    Scribe Attestation  Shani HOLLIS Scribe   attest that this documentation has been prepared under the direction and in the presence of Yanet Brand MD    Provider Attestation - Scribe documentation  All medical record entries made by the Scribe were at my direction and personally dictated by me. I have reviewed the chart and agree that the record accurately  reflects my personal performance of the history, physical exam, discussion and plan.   Yanet Brand MD            Electronically Signed By: Yanet Brand MD   10/15/24  4:40 PM

## 2024-10-28 ENCOUNTER — NURSING HOME VISIT (OUTPATIENT)
Dept: POST ACUTE CARE | Facility: EXTERNAL LOCATION | Age: 84
End: 2024-10-28
Payer: COMMERCIAL

## 2024-10-28 DIAGNOSIS — G30.9 ALZHEIMER'S DISEASE, UNSPECIFIED: ICD-10-CM

## 2024-10-28 DIAGNOSIS — F02.80 ALZHEIMER'S DISEASE, UNSPECIFIED: ICD-10-CM

## 2024-10-28 DIAGNOSIS — R62.7 FAILURE TO THRIVE IN ADULT: Primary | ICD-10-CM

## 2024-10-28 DIAGNOSIS — I10 ESSENTIAL (PRIMARY) HYPERTENSION: ICD-10-CM

## 2024-10-28 DIAGNOSIS — F32.9 MAJOR DEPRESSIVE DISORDER, REMISSION STATUS UNSPECIFIED, UNSPECIFIED WHETHER RECURRENT: ICD-10-CM

## 2024-10-28 PROCEDURE — 99308 SBSQ NF CARE LOW MDM 20: CPT | Performed by: INTERNAL MEDICINE

## 2024-11-11 ENCOUNTER — NURSING HOME VISIT (OUTPATIENT)
Dept: POST ACUTE CARE | Facility: EXTERNAL LOCATION | Age: 84
End: 2024-11-11
Payer: COMMERCIAL

## 2024-11-11 DIAGNOSIS — F02.80 ALZHEIMER'S DISEASE, UNSPECIFIED: ICD-10-CM

## 2024-11-11 DIAGNOSIS — F32.9 MAJOR DEPRESSIVE DISORDER, REMISSION STATUS UNSPECIFIED, UNSPECIFIED WHETHER RECURRENT: Primary | ICD-10-CM

## 2024-11-11 DIAGNOSIS — G30.9 ALZHEIMER'S DISEASE, UNSPECIFIED: ICD-10-CM

## 2024-11-11 DIAGNOSIS — I10 ESSENTIAL (PRIMARY) HYPERTENSION: ICD-10-CM

## 2024-11-11 PROCEDURE — 99309 SBSQ NF CARE MODERATE MDM 30: CPT | Performed by: INTERNAL MEDICINE

## 2024-11-11 NOTE — LETTER
Patient: Ella Simms  : 1940    Encounter Date: 2024    PROGRESS NOTE    Subjective  Chief complaint: Ella Simms is a 84 y.o. female who is a long term care patient being seen and evaluated for monthly general medical care and follow-up    HPI:  HPI  Patient presents for general medical care and f/u.  Patient seen and examined at bedside.  No issues per nursing.  Patient has no acute complaints.  Patient has dementia and requires assist with ADLs.  HTN BP at goal.  Denies chest pain and headache.  Patient with diagnosis of depression.  Mood is stable.  Denies feeling down and thoughts of harming self or others.  Mentation at baseline, no acute distress.    Objective  Vital signs: 130/78, 98.2, 74, 16, 97%    Physical Exam  Constitutional:       General: She is not in acute distress.  Eyes:      Extraocular Movements: Extraocular movements intact.   Cardiovascular:      Rate and Rhythm: Normal rate and regular rhythm.   Pulmonary:      Effort: Pulmonary effort is normal.      Breath sounds: Normal breath sounds.   Abdominal:      General: Bowel sounds are normal.      Palpations: Abdomen is soft.   Musculoskeletal:      Cervical back: Neck supple.      Right lower leg: No edema.      Left lower leg: No edema.   Neurological:      Mental Status: She is alert.      Comments: A&O X1   Psychiatric:         Mood and Affect: Mood normal.         Behavior: Behavior is cooperative.         Assessment/Plan  Problem List Items Addressed This Visit       Essential (primary) hypertension     Monitor blood pressure  MetoprololMonitor blood pressure  Metoprolol         Alzheimer's disease, unspecified     Assist with ADLs and care.  Monitor for changes         Major depressive disorder - Primary     Monitor mood and behaviors.  Continue antidepressants          Medications, treatments, and labs reviewed  Continue medications and treatments as listed in EMR    Shani Barkley Scribe    attest that this documentation has been prepared under the direction and in the presence of Yanet Brand MD    Provider Attestation - Scribe documentation  All medical record entries made by the Scribe were at my direction and personally dictated by me. I have reviewed the chart and agree that the record accurately reflects my personal performance of the history, physical exam, discussion and plan.   Yanet Brand MD            Electronically Signed By: Yanet Brand MD   11/12/24 11:53 AM

## 2024-11-11 NOTE — PROGRESS NOTES
PROGRESS NOTE    Subjective   Chief complaint: Ella Simms is a 84 y.o. female who is a long term care patient being seen and evaluated for monthly general medical care and follow-up    HPI:  HPI  Patient presents for general medical care and f/u.  Patient seen and examined at bedside.  No issues per nursing.  Patient has no acute complaints.  Patient has dementia and requires assist with ADLs.  HTN BP at goal.  Denies chest pain and headache.  Patient with diagnosis of depression.  Mood is stable.  Denies feeling down and thoughts of harming self or others.  Mentation at baseline, no acute distress.    Objective   Vital signs: 130/78, 98.2, 74, 16, 97%    Physical Exam  Constitutional:       General: She is not in acute distress.  Eyes:      Extraocular Movements: Extraocular movements intact.   Cardiovascular:      Rate and Rhythm: Normal rate and regular rhythm.   Pulmonary:      Effort: Pulmonary effort is normal.      Breath sounds: Normal breath sounds.   Abdominal:      General: Bowel sounds are normal.      Palpations: Abdomen is soft.   Musculoskeletal:      Cervical back: Neck supple.      Right lower leg: No edema.      Left lower leg: No edema.   Neurological:      Mental Status: She is alert.      Comments: A&O X1   Psychiatric:         Mood and Affect: Mood normal.         Behavior: Behavior is cooperative.         Assessment/Plan   Problem List Items Addressed This Visit       Essential (primary) hypertension     Monitor blood pressure  MetoprololMonitor blood pressure  Metoprolol         Alzheimer's disease, unspecified     Assist with ADLs and care.  Monitor for changes         Major depressive disorder - Primary     Monitor mood and behaviors.  Continue antidepressants          Medications, treatments, and labs reviewed  Continue medications and treatments as listed in EMR    Scribe Attestation  I, Cathy Savage   attest that this documentation has been prepared under the direction  and in the presence of Yanet Brand MD    Provider Attestation - Scribe documentation  All medical record entries made by the Scribe were at my direction and personally dictated by me. I have reviewed the chart and agree that the record accurately reflects my personal performance of the history, physical exam, discussion and plan.   Yanet Brand MD

## 2024-11-18 ENCOUNTER — NURSING HOME VISIT (OUTPATIENT)
Dept: POST ACUTE CARE | Facility: EXTERNAL LOCATION | Age: 84
End: 2024-11-18
Payer: COMMERCIAL

## 2024-11-18 DIAGNOSIS — F02.80 ALZHEIMER'S DISEASE, UNSPECIFIED: Primary | ICD-10-CM

## 2024-11-18 DIAGNOSIS — R53.1 WEAKNESS: ICD-10-CM

## 2024-11-18 DIAGNOSIS — I10 ESSENTIAL (PRIMARY) HYPERTENSION: ICD-10-CM

## 2024-11-18 DIAGNOSIS — G30.9 ALZHEIMER'S DISEASE, UNSPECIFIED: Primary | ICD-10-CM

## 2024-11-18 PROCEDURE — 99309 SBSQ NF CARE MODERATE MDM 30: CPT | Performed by: INTERNAL MEDICINE

## 2024-11-18 NOTE — PROGRESS NOTES
PROGRESS NOTE    Subjective   Chief complaint: Ella Simms is a 84 y.o. female who is a long term care patient being seen and evaluated for weakness.    HPI:  HPI  Patient is currently working with therapy.  Speech therapy is working with patient to address auditory comprehension.  Therapy also working patient on wheelchair management, analysis of patient's body alignment and functional skills and new or existing wheelchair.  Patient appears to be in no acute distress.  Nursing staff voicing no new concerns.    Objective   Vital signs: 132/80, 98.2, 62, 16, 97%    Physical Exam  Constitutional:       General: She is not in acute distress.  Eyes:      Extraocular Movements: Extraocular movements intact.   Cardiovascular:      Rate and Rhythm: Normal rate and regular rhythm.   Pulmonary:      Effort: Pulmonary effort is normal.      Breath sounds: Normal breath sounds.   Abdominal:      General: Bowel sounds are normal.      Palpations: Abdomen is soft.   Musculoskeletal:      Cervical back: Neck supple.      Right lower leg: No edema.      Left lower leg: No edema.   Neurological:      Mental Status: She is alert.      Comments: A&O X1   Psychiatric:         Mood and Affect: Mood normal.         Behavior: Behavior is cooperative.         Assessment/Plan   Problem List Items Addressed This Visit       Essential (primary) hypertension     Monitor blood pressure  Metoprolol         Alzheimer's disease, unspecified - Primary     Assist with ADLs and care.  Monitor for changes  Speech therapy         Weakness     Continue therapy          Medications, treatments, and labs reviewed  Continue medications and treatments as listed in EMR    Scribe Attestation  I, Cathy Savage   attest that this documentation has been prepared under the direction and in the presence of Yanet Brand MD    Provider Attestation - Scribe documentation  All medical record entries made by the Scribe were at my direction and  personally dictated by me. I have reviewed the chart and agree that the record accurately reflects my personal performance of the history, physical exam, discussion and plan.   Yanet Brand MD

## 2024-11-18 NOTE — LETTER
Patient: Ella Simms  : 1940    Encounter Date: 2024    PROGRESS NOTE    Subjective  Chief complaint: Ella Simms is a 84 y.o. female who is a long term care patient being seen and evaluated for weakness.    HPI:  HPI  Patient is currently working with therapy.  Speech therapy is working with patient to address auditory comprehension.  Therapy also working patient on wheelchair management, analysis of patient's body alignment and functional skills and new or existing wheelchair.  Patient appears to be in no acute distress.  Nursing staff voicing no new concerns.    Objective  Vital signs: 132/80, 98.2, 62, 16, 97%    Physical Exam  Constitutional:       General: She is not in acute distress.  Eyes:      Extraocular Movements: Extraocular movements intact.   Cardiovascular:      Rate and Rhythm: Normal rate and regular rhythm.   Pulmonary:      Effort: Pulmonary effort is normal.      Breath sounds: Normal breath sounds.   Abdominal:      General: Bowel sounds are normal.      Palpations: Abdomen is soft.   Musculoskeletal:      Cervical back: Neck supple.      Right lower leg: No edema.      Left lower leg: No edema.   Neurological:      Mental Status: She is alert.      Comments: A&O X1   Psychiatric:         Mood and Affect: Mood normal.         Behavior: Behavior is cooperative.         Assessment/Plan  Problem List Items Addressed This Visit       Essential (primary) hypertension     Monitor blood pressure  Metoprolol         Alzheimer's disease, unspecified - Primary     Assist with ADLs and care.  Monitor for changes  Speech therapy         Weakness     Continue therapy          Medications, treatments, and labs reviewed  Continue medications and treatments as listed in EMR    Scribe Attestation  I, Cathy Savage   attest that this documentation has been prepared under the direction and in the presence of Yanet Brand MD    Provider Attestation - Scribe documentation  All  medical record entries made by the Scribe were at my direction and personally dictated by me. I have reviewed the chart and agree that the record accurately reflects my personal performance of the history, physical exam, discussion and plan.   Yanet Brand MD            Electronically Signed By: Yanet Brand MD   11/19/24 11:04 AM

## 2024-11-25 ENCOUNTER — NURSING HOME VISIT (OUTPATIENT)
Dept: POST ACUTE CARE | Facility: EXTERNAL LOCATION | Age: 84
End: 2024-11-25
Payer: COMMERCIAL

## 2024-11-25 DIAGNOSIS — R53.1 WEAKNESS: Primary | ICD-10-CM

## 2024-11-25 DIAGNOSIS — F02.80 ALZHEIMER'S DISEASE, UNSPECIFIED: ICD-10-CM

## 2024-11-25 DIAGNOSIS — I10 ESSENTIAL (PRIMARY) HYPERTENSION: ICD-10-CM

## 2024-11-25 DIAGNOSIS — G30.9 ALZHEIMER'S DISEASE, UNSPECIFIED: ICD-10-CM

## 2024-11-25 PROCEDURE — 99309 SBSQ NF CARE MODERATE MDM 30: CPT | Performed by: INTERNAL MEDICINE

## 2024-11-25 NOTE — LETTER
Patient: Ella Simms  : 1940    Encounter Date: 2024    PROGRESS NOTE    Subjective  Chief complaint: Ella Simms is a 84 y.o. female who is a long term care patient being seen and evaluated for weakness    HPI:  HPI  Patient presents for f/u therapy and general medical care.  Patient seen and examined at bedside.  Therapy has been working with the patient to improve strength, endurance, ADLs, and transfers d/t generalized weakness.  Speech therapy is working patient to address auditory comprehension.  Patient appears to be in no acute distress.  Patient has no acute concerns today.  Nursing staff voicing no new concerns    Objective  Vital signs: 130/74, 98.2, 70, 18, 97%    Physical Exam  Constitutional:       General: She is not in acute distress.  Eyes:      Extraocular Movements: Extraocular movements intact.   Cardiovascular:      Rate and Rhythm: Normal rate and regular rhythm.   Pulmonary:      Effort: Pulmonary effort is normal.      Breath sounds: Normal breath sounds.   Abdominal:      General: Bowel sounds are normal.      Palpations: Abdomen is soft.   Musculoskeletal:      Cervical back: Neck supple.      Right lower leg: No edema.      Left lower leg: No edema.   Neurological:      Mental Status: She is alert.      Comments: A&O X1   Psychiatric:         Mood and Affect: Mood normal.         Behavior: Behavior is cooperative.         Assessment/Plan  Problem List Items Addressed This Visit       Essential (primary) hypertension     Monitor blood pressure  Metoprolol         Alzheimer's disease, unspecified     Assist with ADLs and care.  Monitor for changes  Speech therapy         Weakness - Primary     Continue therapy, work towards goals          Medications, treatments, and labs reviewed  Continue medications and treatments as listed in EMR    Scribe Attestation  I, Cathy Savage   attest that this documentation has been prepared under the direction and in the  presence of Yanet Brand MD    Provider Attestation - Scribe documentation  All medical record entries made by the Scribe were at my direction and personally dictated by me. I have reviewed the chart and agree that the record accurately reflects my personal performance of the history, physical exam, discussion and plan.   Yanet Brand MD            Electronically Signed By: Yanet Brand MD   11/26/24 12:00 PM

## 2024-11-25 NOTE — PROGRESS NOTES
PROGRESS NOTE    Subjective   Chief complaint: Ella Simms is a 84 y.o. female who is a long term care patient being seen and evaluated for weakness    HPI:  HPI  Patient presents for f/u therapy and general medical care.  Patient seen and examined at bedside.  Therapy has been working with the patient to improve strength, endurance, ADLs, and transfers d/t generalized weakness.  Speech therapy is working patient to address auditory comprehension.  Patient appears to be in no acute distress.  Patient has no acute concerns today.  Nursing staff voicing no new concerns    Objective   Vital signs: 130/74, 98.2, 70, 18, 97%    Physical Exam  Constitutional:       General: She is not in acute distress.  Eyes:      Extraocular Movements: Extraocular movements intact.   Cardiovascular:      Rate and Rhythm: Normal rate and regular rhythm.   Pulmonary:      Effort: Pulmonary effort is normal.      Breath sounds: Normal breath sounds.   Abdominal:      General: Bowel sounds are normal.      Palpations: Abdomen is soft.   Musculoskeletal:      Cervical back: Neck supple.      Right lower leg: No edema.      Left lower leg: No edema.   Neurological:      Mental Status: She is alert.      Comments: A&O X1   Psychiatric:         Mood and Affect: Mood normal.         Behavior: Behavior is cooperative.         Assessment/Plan   Problem List Items Addressed This Visit       Essential (primary) hypertension     Monitor blood pressure  Metoprolol         Alzheimer's disease, unspecified     Assist with ADLs and care.  Monitor for changes  Speech therapy         Weakness - Primary     Continue therapy, work towards goals          Medications, treatments, and labs reviewed  Continue medications and treatments as listed in EMR    Scribe Attestation  I, Cathy Savage   attest that this documentation has been prepared under the direction and in the presence of Yanet Brand MD    Provider Attestation - Scribe  documentation  All medical record entries made by the Scribe were at my direction and personally dictated by me. I have reviewed the chart and agree that the record accurately reflects my personal performance of the history, physical exam, discussion and plan.   Yanet Brand MD

## 2024-12-02 ENCOUNTER — NURSING HOME VISIT (OUTPATIENT)
Dept: POST ACUTE CARE | Facility: EXTERNAL LOCATION | Age: 84
End: 2024-12-02
Payer: COMMERCIAL

## 2024-12-02 DIAGNOSIS — G30.9 ALZHEIMER'S DISEASE, UNSPECIFIED: ICD-10-CM

## 2024-12-02 DIAGNOSIS — I10 ESSENTIAL (PRIMARY) HYPERTENSION: ICD-10-CM

## 2024-12-02 DIAGNOSIS — F02.80 ALZHEIMER'S DISEASE, UNSPECIFIED: ICD-10-CM

## 2024-12-02 DIAGNOSIS — R53.1 WEAKNESS: Primary | ICD-10-CM

## 2024-12-02 PROCEDURE — 99309 SBSQ NF CARE MODERATE MDM 30: CPT | Performed by: INTERNAL MEDICINE

## 2024-12-02 NOTE — PROGRESS NOTES
PROGRESS NOTE    Subjective   Chief complaint: Ella Simms is a 84 y.o. female who is an acute skilled patient being seen and evaluated for weakness    HPI:  HPI  Patient presents for f/u therapy and general medical care.  Therapy has been working with the patient to improve strength, endurance, ADLs, and transfers d/t generalized weakness.  Patient has no acute concerns today. Patient seen and examined at bedside.   Patient appears to be in no acute distress.  Denies constitutional symptoms.  Nursing staff voicing no new concerns.    Objective   Vital signs: 130/74, 98.2, 72, 16, 97%    Physical Exam  Constitutional:       General: She is not in acute distress.  Eyes:      Extraocular Movements: Extraocular movements intact.   Cardiovascular:      Rate and Rhythm: Normal rate and regular rhythm.   Pulmonary:      Effort: Pulmonary effort is normal.      Breath sounds: Normal breath sounds.   Abdominal:      General: Bowel sounds are normal.      Palpations: Abdomen is soft.   Musculoskeletal:      Cervical back: Neck supple.      Right lower leg: No edema.      Left lower leg: No edema.   Neurological:      Mental Status: She is alert.      Comments: A&O X1   Psychiatric:         Mood and Affect: Mood normal.         Behavior: Behavior is cooperative.         Assessment/Plan   Problem List Items Addressed This Visit       Essential (primary) hypertension     Monitor blood pressure  Metoprolol         Alzheimer's disease, unspecified     Assist with ADLs and care.  Monitor for changes  Speech therapy         Weakness - Primary     Working towards goals with therapy.  Continue          Medications, treatments, and labs reviewed  Continue medications and treatments as listed in EMR      Scribe Attestation  I, Cathy Savage   attest that this documentation has been prepared under the direction and in the presence of Yanet Brand MD    Provider Attestation - Scribe documentation  All medical record  entries made by the Scribe were at my direction and personally dictated by me. I have reviewed the chart and agree that the record accurately reflects my personal performance of the history, physical exam, discussion and plan.   Yanet Brand MD

## 2024-12-02 NOTE — LETTER
Patient: Ella Simms  : 1940    Encounter Date: 2024    PROGRESS NOTE    Subjective  Chief complaint: Ella Simms is a 84 y.o. female who is an acute skilled patient being seen and evaluated for weakness    HPI:  HPI  Patient presents for f/u therapy and general medical care.  Therapy has been working with the patient to improve strength, endurance, ADLs, and transfers d/t generalized weakness.  Patient has no acute concerns today. Patient seen and examined at bedside.   Patient appears to be in no acute distress.  Denies constitutional symptoms.  Nursing staff voicing no new concerns.    Objective  Vital signs: 130/74, 98.2, 72, 16, 97%    Physical Exam  Constitutional:       General: She is not in acute distress.  Eyes:      Extraocular Movements: Extraocular movements intact.   Cardiovascular:      Rate and Rhythm: Normal rate and regular rhythm.   Pulmonary:      Effort: Pulmonary effort is normal.      Breath sounds: Normal breath sounds.   Abdominal:      General: Bowel sounds are normal.      Palpations: Abdomen is soft.   Musculoskeletal:      Cervical back: Neck supple.      Right lower leg: No edema.      Left lower leg: No edema.   Neurological:      Mental Status: She is alert.      Comments: A&O X1   Psychiatric:         Mood and Affect: Mood normal.         Behavior: Behavior is cooperative.         Assessment/Plan  Problem List Items Addressed This Visit       Essential (primary) hypertension     Monitor blood pressure  Metoprolol         Alzheimer's disease, unspecified     Assist with ADLs and care.  Monitor for changes  Speech therapy         Weakness - Primary     Working towards goals with therapy.  Continue          Medications, treatments, and labs reviewed  Continue medications and treatments as listed in EMR      Scribe Attestation  TELMA, Cathy Savage   attest that this documentation has been prepared under the direction and in the presence of Yanet Brand  MD    Provider Attestation - Scribe documentation  All medical record entries made by the Scribe were at my direction and personally dictated by me. I have reviewed the chart and agree that the record accurately reflects my personal performance of the history, physical exam, discussion and plan.   Yanet Brand MD        Electronically Signed By: Yanet Brand MD   12/3/24 12:30 PM

## 2024-12-09 ENCOUNTER — NURSING HOME VISIT (OUTPATIENT)
Dept: POST ACUTE CARE | Facility: EXTERNAL LOCATION | Age: 84
End: 2024-12-09
Payer: COMMERCIAL

## 2024-12-09 DIAGNOSIS — G30.9 ALZHEIMER'S DISEASE, UNSPECIFIED: ICD-10-CM

## 2024-12-09 DIAGNOSIS — R53.1 WEAKNESS: Primary | ICD-10-CM

## 2024-12-09 DIAGNOSIS — I10 ESSENTIAL (PRIMARY) HYPERTENSION: ICD-10-CM

## 2024-12-09 DIAGNOSIS — F02.80 ALZHEIMER'S DISEASE, UNSPECIFIED: ICD-10-CM

## 2024-12-09 PROCEDURE — 99308 SBSQ NF CARE LOW MDM 20: CPT | Performed by: INTERNAL MEDICINE

## 2024-12-09 NOTE — PROGRESS NOTES
PROGRESS NOTE    Subjective   Chief complaint: Ella Simms is a 84 y.o. female who is a long term care patient being seen and evaluated for weakness.    HPI:  HPI  Patient has been working in therapy to improve strength, endurance, and ADLs.  Patient continues to work toward goals.  Patient was seen and examined at the bedside, appears to be in no acute distress.  Denies n/v/f/c pain.  Nursing staff voicing no new concerns.    Objective   Vital signs: 134/79, 98.2, 70, 16,96%    Physical Exam  Constitutional:       General: She is not in acute distress.  Eyes:      Extraocular Movements: Extraocular movements intact.   Cardiovascular:      Rate and Rhythm: Normal rate and regular rhythm.   Pulmonary:      Effort: Pulmonary effort is normal.      Breath sounds: Normal breath sounds.   Abdominal:      General: Bowel sounds are normal.      Palpations: Abdomen is soft.   Musculoskeletal:      Cervical back: Neck supple.      Right lower leg: No edema.      Left lower leg: No edema.   Neurological:      Mental Status: She is alert.      Motor: Weakness present.      Comments: A&O X1   Psychiatric:         Mood and Affect: Mood normal.         Behavior: Behavior is cooperative.         Assessment/Plan   Problem List Items Addressed This Visit       Essential (primary) hypertension     Monitor blood pressure  Metoprolol         Alzheimer's disease, unspecified     Assist with ADLs and care.  Monitor for changes  Speech therapy         Weakness - Primary     Continue progressing in therapy towards goals established          Medications, treatments, and labs reviewed  Continue medications and treatments as listed in EMR    Scribe Attestation  TELMA, Cathy Savage   attest that this documentation has been prepared under the direction and in the presence of Yanet Brand MD    Provider Attestation - Scribe documentation  All medical record entries made by the Scribe were at my direction and personally dictated by  me. I have reviewed the chart and agree that the record accurately reflects my personal performance of the history, physical exam, discussion and plan.   Yanet Brand MD

## 2024-12-09 NOTE — LETTER
Patient: Ella Simms  : 1940    Encounter Date: 2024    PROGRESS NOTE    Subjective  Chief complaint: Ella Simms is a 84 y.o. female who is a long term care patient being seen and evaluated for weakness.    HPI:  HPI  Patient has been working in therapy to improve strength, endurance, and ADLs.  Patient continues to work toward goals.  Patient was seen and examined at the bedside, appears to be in no acute distress.  Denies n/v/f/c pain.  Nursing staff voicing no new concerns.    Objective  Vital signs: 134/79, 98.2, 70, 16,96%    Physical Exam  Constitutional:       General: She is not in acute distress.  Eyes:      Extraocular Movements: Extraocular movements intact.   Cardiovascular:      Rate and Rhythm: Normal rate and regular rhythm.   Pulmonary:      Effort: Pulmonary effort is normal.      Breath sounds: Normal breath sounds.   Abdominal:      General: Bowel sounds are normal.      Palpations: Abdomen is soft.   Musculoskeletal:      Cervical back: Neck supple.      Right lower leg: No edema.      Left lower leg: No edema.   Neurological:      Mental Status: She is alert.      Motor: Weakness present.      Comments: A&O X1   Psychiatric:         Mood and Affect: Mood normal.         Behavior: Behavior is cooperative.         Assessment/Plan  Problem List Items Addressed This Visit       Essential (primary) hypertension     Monitor blood pressure  Metoprolol         Alzheimer's disease, unspecified     Assist with ADLs and care.  Monitor for changes  Speech therapy         Weakness - Primary     Continue progressing in therapy towards goals established          Medications, treatments, and labs reviewed  Continue medications and treatments as listed in EMR    Scribe Attestation  I, Cathy Savage   attest that this documentation has been prepared under the direction and in the presence of Yanet Brand MD    Provider Attestation - Scribe documentation  All medical record  entries made by the Scribe were at my direction and personally dictated by me. I have reviewed the chart and agree that the record accurately reflects my personal performance of the history, physical exam, discussion and plan.   Yanet Brand MD            Electronically Signed By: Yanet Brand MD   12/10/24 12:44 PM

## 2024-12-23 ENCOUNTER — NURSING HOME VISIT (OUTPATIENT)
Dept: POST ACUTE CARE | Facility: EXTERNAL LOCATION | Age: 84
End: 2024-12-23
Payer: COMMERCIAL

## 2024-12-23 DIAGNOSIS — I10 ESSENTIAL (PRIMARY) HYPERTENSION: ICD-10-CM

## 2024-12-23 DIAGNOSIS — F32.9 MAJOR DEPRESSIVE DISORDER, REMISSION STATUS UNSPECIFIED, UNSPECIFIED WHETHER RECURRENT: ICD-10-CM

## 2024-12-23 DIAGNOSIS — F02.80 ALZHEIMER'S DISEASE, UNSPECIFIED: Primary | ICD-10-CM

## 2024-12-23 DIAGNOSIS — G30.9 ALZHEIMER'S DISEASE, UNSPECIFIED: Primary | ICD-10-CM

## 2024-12-23 PROCEDURE — 99305 1ST NF CARE MODERATE MDM 35: CPT | Performed by: INTERNAL MEDICINE

## 2024-12-23 NOTE — LETTER
Patient: Ella Simms  : 1940    Encounter Date: 2024    PROGRESS NOTE    Subjective  Chief complaint: Ella Simsm is a 84 y.o. female who is a long term care patient being seen and evaluated for monthly general medical care and follow-up    HPI:  HPI  Patient presents for general medical care and f/u.  Patient seen and examined at bedside.  No issues per nursing.  Patient has no acute complaints.  Patient has dementia and requires assist with ADLs.  HTN BP at goal.  Denies chest pain and headache.  Patient with diagnosis of depression.  Mood is stable.  Denies feeling down and thoughts of harming self or others.  Mentation at baseline, no acute distress.    Objective  Vital signs: 128/74, 98.2, 72, 16, 97%    Physical Exam  Constitutional:       General: She is not in acute distress.  Eyes:      Extraocular Movements: Extraocular movements intact.   Cardiovascular:      Rate and Rhythm: Normal rate and regular rhythm.   Pulmonary:      Effort: Pulmonary effort is normal.      Breath sounds: Normal breath sounds.   Abdominal:      General: Bowel sounds are normal.      Palpations: Abdomen is soft.   Musculoskeletal:      Cervical back: Neck supple.      Right lower leg: No edema.      Left lower leg: No edema.   Neurological:      Mental Status: She is alert.      Comments: A&O X1   Psychiatric:         Mood and Affect: Mood normal.         Behavior: Behavior is cooperative.         Assessment/Plan  Problem List Items Addressed This Visit       Essential (primary) hypertension     Monitor blood pressure  Metoprolol         Alzheimer's disease, unspecified - Primary     Assist with ADLs and care.  Monitor for changes         Major depressive disorder     Monitor mood and behaviors.  Continue antidepressants          Medications, treatments, and labs reviewed  Continue medications and treatments as listed in EMR    Scribe Attestation  I, Cathy Savage   attest that this documentation has  been prepared under the direction and in the presence of Yanet Brand MD    Provider Attestation - Scribe documentation  All medical record entries made by the Scribe were at my direction and personally dictated by me. I have reviewed the chart and agree that the record accurately reflects my personal performance of the history, physical exam, discussion and plan.   Yanet Brand MD            Electronically Signed By: Yanet Brand MD   12/24/24  1:38 PM

## 2024-12-23 NOTE — PROGRESS NOTES
PROGRESS NOTE    Subjective   Chief complaint: Ella Simms is a 84 y.o. female who is a long term care patient being seen and evaluated for monthly general medical care and follow-up    HPI:  HPI  Patient presents for general medical care and f/u.  Patient seen and examined at bedside.  No issues per nursing.  Patient has no acute complaints.  Patient has dementia and requires assist with ADLs.  HTN BP at goal.  Denies chest pain and headache.  Patient with diagnosis of depression.  Mood is stable.  Denies feeling down and thoughts of harming self or others.  Mentation at baseline, no acute distress.    Objective   Vital signs: 128/74, 98.2, 72, 16, 97%    Physical Exam  Constitutional:       General: She is not in acute distress.  Eyes:      Extraocular Movements: Extraocular movements intact.   Cardiovascular:      Rate and Rhythm: Normal rate and regular rhythm.   Pulmonary:      Effort: Pulmonary effort is normal.      Breath sounds: Normal breath sounds.   Abdominal:      General: Bowel sounds are normal.      Palpations: Abdomen is soft.   Musculoskeletal:      Cervical back: Neck supple.      Right lower leg: No edema.      Left lower leg: No edema.   Neurological:      Mental Status: She is alert.      Comments: A&O X1   Psychiatric:         Mood and Affect: Mood normal.         Behavior: Behavior is cooperative.         Assessment/Plan   Problem List Items Addressed This Visit       Essential (primary) hypertension     Monitor blood pressure  Metoprolol         Alzheimer's disease, unspecified - Primary     Assist with ADLs and care.  Monitor for changes         Major depressive disorder     Monitor mood and behaviors.  Continue antidepressants          Medications, treatments, and labs reviewed  Continue medications and treatments as listed in EMR    Scribe Attestation  Shani HOLLIS Scribe   attest that this documentation has been prepared under the direction and in the presence of Yanet CORLEY  MD Sunday    Provider Attestation - Scribe documentation  All medical record entries made by the Scribe were at my direction and personally dictated by me. I have reviewed the chart and agree that the record accurately reflects my personal performance of the history, physical exam, discussion and plan.   Yanet Brand MD